# Patient Record
Sex: FEMALE | Race: WHITE | NOT HISPANIC OR LATINO
[De-identification: names, ages, dates, MRNs, and addresses within clinical notes are randomized per-mention and may not be internally consistent; named-entity substitution may affect disease eponyms.]

---

## 2017-05-21 ENCOUNTER — TRANSCRIPTION ENCOUNTER (OUTPATIENT)
Age: 62
End: 2017-05-21

## 2017-08-01 ENCOUNTER — OUTPATIENT (OUTPATIENT)
Dept: OUTPATIENT SERVICES | Facility: HOSPITAL | Age: 62
LOS: 1 days | End: 2017-08-01
Payer: COMMERCIAL

## 2017-08-01 ENCOUNTER — APPOINTMENT (OUTPATIENT)
Dept: MAMMOGRAPHY | Facility: HOSPITAL | Age: 62
End: 2017-08-01
Payer: COMMERCIAL

## 2017-08-01 PROCEDURE — 77063 BREAST TOMOSYNTHESIS BI: CPT | Mod: 26

## 2017-08-01 PROCEDURE — G0202: CPT | Mod: 26

## 2017-08-01 PROCEDURE — 77067 SCR MAMMO BI INCL CAD: CPT

## 2017-08-01 PROCEDURE — 77063 BREAST TOMOSYNTHESIS BI: CPT

## 2017-11-23 ENCOUNTER — EMERGENCY (EMERGENCY)
Facility: HOSPITAL | Age: 62
LOS: 1 days | Discharge: HOME | End: 2017-11-23
Admitting: EMERGENCY MEDICINE
Payer: COMMERCIAL

## 2017-11-23 PROCEDURE — 99284 EMERGENCY DEPT VISIT MOD MDM: CPT | Mod: 25

## 2017-11-24 PROCEDURE — 96374 THER/PROPH/DIAG INJ IV PUSH: CPT

## 2017-11-24 PROCEDURE — 83690 ASSAY OF LIPASE: CPT

## 2017-11-24 PROCEDURE — 99284 EMERGENCY DEPT VISIT MOD MDM: CPT | Mod: 25

## 2017-11-24 PROCEDURE — 85027 COMPLETE CBC AUTOMATED: CPT

## 2017-11-24 PROCEDURE — 80053 COMPREHEN METABOLIC PANEL: CPT

## 2017-11-24 PROCEDURE — 96361 HYDRATE IV INFUSION ADD-ON: CPT

## 2018-08-09 ENCOUNTER — APPOINTMENT (OUTPATIENT)
Dept: MAMMOGRAPHY | Facility: HOSPITAL | Age: 63
End: 2018-08-09
Payer: COMMERCIAL

## 2018-08-09 ENCOUNTER — OUTPATIENT (OUTPATIENT)
Dept: OUTPATIENT SERVICES | Facility: HOSPITAL | Age: 63
LOS: 1 days | End: 2018-08-09
Payer: COMMERCIAL

## 2018-08-09 DIAGNOSIS — Z00.8 ENCOUNTER FOR OTHER GENERAL EXAMINATION: ICD-10-CM

## 2018-08-09 PROCEDURE — 77067 SCR MAMMO BI INCL CAD: CPT | Mod: 26

## 2018-08-09 PROCEDURE — 77063 BREAST TOMOSYNTHESIS BI: CPT

## 2018-08-09 PROCEDURE — 77067 SCR MAMMO BI INCL CAD: CPT

## 2018-08-09 PROCEDURE — 77063 BREAST TOMOSYNTHESIS BI: CPT | Mod: 26

## 2018-08-13 ENCOUNTER — OUTPATIENT (OUTPATIENT)
Dept: OUTPATIENT SERVICES | Facility: HOSPITAL | Age: 63
LOS: 1 days | End: 2018-08-13
Payer: COMMERCIAL

## 2018-08-13 ENCOUNTER — APPOINTMENT (OUTPATIENT)
Dept: RADIOLOGY | Facility: HOSPITAL | Age: 63
End: 2018-08-13
Payer: COMMERCIAL

## 2018-08-13 DIAGNOSIS — M85.80 OTHER SPECIFIED DISORDERS OF BONE DENSITY AND STRUCTURE, UNSPECIFIED SITE: ICD-10-CM

## 2018-08-13 PROCEDURE — 77080 DXA BONE DENSITY AXIAL: CPT | Mod: 26

## 2018-08-13 PROCEDURE — 77080 DXA BONE DENSITY AXIAL: CPT

## 2019-02-08 NOTE — COUNSELING
[Breast Self Exam] : breast self exam [Nutrition] : nutrition [Exercise] : exercise [Vitamins/Supplements] : vitamins/supplements [Vulvar Hygiene] : vulvar hygiene

## 2019-02-11 ENCOUNTER — APPOINTMENT (OUTPATIENT)
Dept: OBGYN | Facility: CLINIC | Age: 64
End: 2019-02-11
Payer: COMMERCIAL

## 2019-02-11 VITALS
SYSTOLIC BLOOD PRESSURE: 116 MMHG | WEIGHT: 151 LBS | DIASTOLIC BLOOD PRESSURE: 76 MMHG | BODY MASS INDEX: 27.79 KG/M2 | HEIGHT: 62 IN | HEART RATE: 74 BPM

## 2019-02-11 DIAGNOSIS — Z01.419 ENCOUNTER FOR GYNECOLOGICAL EXAMINATION (GENERAL) (ROUTINE) W/OUT ABNORMAL FINDINGS: ICD-10-CM

## 2019-02-11 DIAGNOSIS — N95.2 POSTMENOPAUSAL ATROPHIC VAGINITIS: ICD-10-CM

## 2019-02-11 PROCEDURE — 99396 PREV VISIT EST AGE 40-64: CPT

## 2019-02-11 NOTE — PHYSICAL EXAM
[Awake] : awake [Alert] : alert [Soft] : soft [Oriented x3] : oriented to person, place, and time [Vulvar Atrophy] : vulvar atrophy [Normal] : uterus [Atrophy] : atrophy [No Bleeding] : there was no active vaginal bleeding [Uterine Adnexae] : were not tender and not enlarged [Acute Distress] : no acute distress [Mass] : no breast mass [Nipple Discharge] : no nipple discharge [Axillary LAD] : no axillary lymphadenopathy [Tender] : non tender

## 2019-02-21 LAB
CYTOLOGY CVX/VAG DOC THIN PREP: NORMAL
HPV HIGH+LOW RISK DNA PNL CVX: NOT DETECTED

## 2019-03-13 ENCOUNTER — EMERGENCY (EMERGENCY)
Facility: HOSPITAL | Age: 64
LOS: 1 days | Discharge: HOME | End: 2019-03-13
Attending: EMERGENCY MEDICINE | Admitting: EMERGENCY MEDICINE
Payer: COMMERCIAL

## 2019-03-13 VITALS
SYSTOLIC BLOOD PRESSURE: 120 MMHG | TEMPERATURE: 98 F | HEART RATE: 71 BPM | DIASTOLIC BLOOD PRESSURE: 69 MMHG | RESPIRATION RATE: 16 BRPM | WEIGHT: 147.05 LBS | OXYGEN SATURATION: 99 % | HEIGHT: 61 IN

## 2019-03-13 VITALS
DIASTOLIC BLOOD PRESSURE: 75 MMHG | OXYGEN SATURATION: 98 % | RESPIRATION RATE: 16 BRPM | SYSTOLIC BLOOD PRESSURE: 138 MMHG | HEART RATE: 69 BPM | TEMPERATURE: 98 F

## 2019-03-13 PROCEDURE — 99283 EMERGENCY DEPT VISIT LOW MDM: CPT | Mod: 25

## 2019-03-13 PROCEDURE — 99283 EMERGENCY DEPT VISIT LOW MDM: CPT

## 2019-03-13 PROCEDURE — 73562 X-RAY EXAM OF KNEE 3: CPT

## 2019-03-13 PROCEDURE — 73562 X-RAY EXAM OF KNEE 3: CPT | Mod: 26,LT

## 2019-03-13 RX ORDER — IBUPROFEN 200 MG
600 TABLET ORAL ONCE
Qty: 0 | Refills: 0 | Status: COMPLETED | OUTPATIENT
Start: 2019-03-13 | End: 2019-03-13

## 2019-03-13 RX ADMIN — Medication 600 MILLIGRAM(S): at 18:50

## 2019-03-13 RX ADMIN — Medication 600 MILLIGRAM(S): at 18:26

## 2019-03-13 NOTE — ED PROVIDER NOTE - MUSCULOSKELETAL, MLM
L knee: +ttp inferior/lateral knee with FROM, +mild inflammation noted, no erythema noted, skin inatct, FROM knee, toes warm & mobile, cap refill<2sec, pulses and sensation intact, NVI; Spine appears normal, range of motion is not limited

## 2019-03-13 NOTE — ED PROVIDER NOTE - CLINICAL SUMMARY MEDICAL DECISION MAKING FREE TEXT BOX
62 y/o F with L knee pain s/p twisting injury today in the airplane, will give motrin, ice, xray, knee immobilizer, crutches, f/u ortho

## 2019-03-13 NOTE — ED PROVIDER NOTE - ATTENDING CONTRIBUTION TO CARE
63 y.o. c/o left knee pain, was getting into airplane seat, tripped over bag on floor, left knee underwent varus stress, very difficult to bear weight, pain is mostly with weight bearing, no h/o injury to knee previously, no paresthesias, no other injury, does have an orthopedist group for f/u; on exam pt is wd, wn, nad; left LE - no deformity, mild ttp lateral knee joint, + pain with varus stress, negative drawer signs, able to straight leg raise, FROM hip/ankle/foot, mild knee effusion, skin intact, distal sensation intact, 2+ DP pulses; A/P xr negative, PA to apply knee immobilizer and provide crutches, to f/u ortho as outpt

## 2019-03-13 NOTE — ED ADULT NURSE REASSESSMENT NOTE - NS ED NURSE REASSESS COMMENT FT1
pt instructed on use of crutches. knee immobilizer applied. pt d/c via w/c. NAD
received report and assumed care of pt at change of shift. pt awake and alert. seen and examined by dr george. x-ray completed. NAD presently

## 2019-03-13 NOTE — ED PROVIDER NOTE - PROGRESS NOTE DETAILS
Pt examined by ED attending, Dr. Sharp, will assess pt and discuss plan. Reevaluated patient at bedside.  Patient feeling much improved.  Discussed the results of all diagnostic testing in ED and copies of all reports given. Will place in knee immobilizer, crutches, f/u ortho.  An opportunity to ask questions was given.  Discussed the importance of prompt, close medical follow-up.  Patient will return with any changes, concerns or persistent / worsening symptoms.  Understanding of all instructions verbalized. PAUL Zaman: this pt was not seen by myself

## 2019-03-13 NOTE — ED ADULT NURSE NOTE - OBJECTIVE STATEMENT
Amb to ED with crutches.. Pt just got [place] the plane from [place]. Getting on plane she tripped on a carryon bag twisting her left knee. States she can't bear wt on it. Discomfort with movement. No swelling or discoloration noted.

## 2019-03-13 NOTE — ED ADULT NURSE NOTE - NSIMPLEMENTINTERV_GEN_ALL_ED
Implemented All Universal Safety Interventions:  [place] to call system. Call bell, personal items and telephone within reach. Instruct patient to call for assistance. Room bathroom lighting operational. Non-slip footwear when patient is off stretcher. Physically safe environment: no spills, clutter or unnecessary equipment. Stretcher in lowest position, wheels locked, appropriate side rails in place.

## 2019-03-13 NOTE — ED PROVIDER NOTE - OBJECTIVE STATEMENT
64 y/o F presents with c/o L knee pain today. Pt states that her L foot got caught on a bag in the airplane today, twisted her knee and "heard a crack and pop" in her L knee. States that she sat in the plane for 3 hours and got back to [place], pain was not bad at first when she got off the plane but states that it has been progressively getting worse with difficulty weight bearing on left leg due to pain. Denies direct trauma of knee, open wounds, fall, numbness, tingling, calf pain/swelling, other injuries/symptoms.

## 2019-03-15 PROBLEM — E03.9 HYPOTHYROIDISM, UNSPECIFIED: Chronic | Status: ACTIVE | Noted: 2019-03-13

## 2019-03-18 ENCOUNTER — APPOINTMENT (OUTPATIENT)
Dept: ORTHOPEDIC SURGERY | Facility: CLINIC | Age: 64
End: 2019-03-18

## 2019-03-19 ENCOUNTER — APPOINTMENT (OUTPATIENT)
Dept: ORTHOPEDIC SURGERY | Facility: CLINIC | Age: 64
End: 2019-03-19
Payer: COMMERCIAL

## 2019-03-19 DIAGNOSIS — S89.92XA UNSPECIFIED INJURY OF LEFT LOWER LEG, INITIAL ENCOUNTER: ICD-10-CM

## 2019-03-19 PROCEDURE — 99203 OFFICE O/P NEW LOW 30 MIN: CPT

## 2019-03-19 NOTE — PHYSICAL EXAM
[de-identified] : Oriented to time, place, person\par Mood: Normal\par Affect: Normal\par Appearance: Healthy, well appearing, no acute distress.\par Gait: Antalgic\par Assistive Devices: Immobilizer/crutches\par \par Left knee exam\par \par Skin: Clean, dry, intact\par Inspection: No obvious malalignment, no masses, moderate swelling, mild effusion\par Pulses: 2+ DP/PT pulses\par ROM: 0-95 degrees of flexion. Moderate pain with deep knee flexion/extension.\par Tenderness: Mild MJLT. Positive LJLT. No pain over the patella facets. No pain to the quadriceps tendon. No pain to the patella tendon. No posterior knee tenderness.\par Stability: Stable to varus, valgus. IIB lachman testing. Positive anterior drawer, negative posterior drawer.\par Strength: 5/5 Q/H/TA/GS/EHL, without atrophy\par Neuro: In tact to light touch throughout, DTR's normal\par Additional tests: Positive McMurrays test, Negative patellar grind test.  [de-identified] : Images were reviewed from the ER dated 3.13.19. \par \par Multiple images left knee showed no evidence of bony injury, or mikie dislocation. There is no underlying degenerative arthritic change seen. Overall alignment is maintained. Otherwise unremarkable.

## 2019-03-19 NOTE — HISTORY OF PRESENT ILLNESS
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 She tripped over a bag on plane and fell directly into her knee She felt a pop and when she got off the plain her knee gave out on her walking. She was evaluated at ER and x-rays were negative for fx. She was placed in straight leg brace and ambulating via crutches. She reports that even with brace feels like her knee is going to buckle. Pain is described to be dull,  intermittent, brought on with when weightbearing. She has continued to work until yesterday. Denies  catching, locking, numbness or tingling. She is not taking pain medication. \par \par The patient's past medical history, past surgical history, medications and allergies were reviewed by me today with the patient and documented accordingly. In addition, the patient's family and social history, which were noncontributory to this visit, were reviewed also.\par

## 2019-03-19 NOTE — DISCUSSION/SUMMARY
[de-identified] : 63-year-old female with left knee injury\par \par Patient presents with an acute injury to her left knee. Patient has a resultant instability consistent with possible ligamentous injury. Consent for anterior cruciate ligament disruption as well as some associated bone contusions to the lateral joint. Considering patient's activity level, function, recommendation would be for MRI imaging to determine degree of internal derangement left knee.\par \par Recommendation: WBAT, ice, NSAIDs, discontinue brace, range of motion to tolerance. Crutches as needed.\par \par Followup after MRI

## 2019-03-21 ENCOUNTER — OUTPATIENT (OUTPATIENT)
Dept: OUTPATIENT SERVICES | Facility: HOSPITAL | Age: 64
LOS: 1 days | End: 2019-03-21
Payer: COMMERCIAL

## 2019-03-21 ENCOUNTER — APPOINTMENT (OUTPATIENT)
Dept: MRI IMAGING | Facility: CLINIC | Age: 64
End: 2019-03-21
Payer: COMMERCIAL

## 2019-03-21 DIAGNOSIS — Z00.8 ENCOUNTER FOR OTHER GENERAL EXAMINATION: ICD-10-CM

## 2019-03-21 PROCEDURE — 73721 MRI JNT OF LWR EXTRE W/O DYE: CPT

## 2019-03-21 PROCEDURE — 73721 MRI JNT OF LWR EXTRE W/O DYE: CPT | Mod: 26,LT

## 2019-03-25 ENCOUNTER — APPOINTMENT (OUTPATIENT)
Dept: ORTHOPEDIC SURGERY | Facility: CLINIC | Age: 64
End: 2019-03-25
Payer: COMMERCIAL

## 2019-03-25 PROCEDURE — 99214 OFFICE O/P EST MOD 30 MIN: CPT

## 2019-03-29 NOTE — DISCUSSION/SUMMARY
[de-identified] : 63-year-old female with left knee ACL tear/medial meniscus tear\par \par Patient's acute injury to the left knee consistent with anterior cruciate ligament disruption as well as discomfort in the medial compartment consistent with posterior horn tear. A discussion was had with the patient regarding these findings, as well as treatment alternatives. Considering her high activity level the patient, patient is considering anterior cruciate ligament reconstruction. I discussed potential options for conservative/nonoperative management with activity restriction with possible brace use as an alternative.\par \par All risks, benefits and alternatives to left anterior cruciate ligament reconstruction with evaluation of the meniscal and chondral surfaces were discussed in great detail with the patient. Risks include but are not limited to pain, bleeding, infection, stiffness/instability, impingement, graft re-rupture, meniscectomy versus repair, medical complications and risks of anesthesia. In addition, a discussion was had with the patient regarding anterior cruciate ligament graft options. This included the role of autograft versus allograft, and the associated donor site morbidity and rerupture rate with autograft versus risk of disease transmission/rerupture with allograft use. The patient expressed understanding and all questions were answered. \par \par The patient is electing to proceed with a left ACL allograft reconstruction with evaluation of the medial meniscus, and will have the patient scheduled accordingly.

## 2019-03-29 NOTE — PHYSICAL EXAM
[de-identified] : Oriented to time, place, person\par Mood: Normal\par Affect: Normal\par Appearance: Healthy, well appearing, no acute distress.\par Gait: Antalgic\par Assistive Devices: Immobilizer/crutches\par \par Left knee exam\par \par Skin: Clean, dry, intact\par Inspection: No obvious malalignment, no masses, moderate swelling, mild effusion\par Pulses: 2+ DP/PT pulses\par ROM: 0-155 degrees of flexion. Moderate pain with deep knee flexion/extension.\par Tenderness: Mild MJLT. Positive LJLT. No pain over the patella facets. No pain to the quadriceps tendon. No pain to the patella tendon. No posterior knee tenderness.\par Stability: Stable to varus, valgus. IIB lachman testing. Positive anterior drawer, negative posterior drawer.\par Strength: 5/5 Q/H/TA/GS/EHL, without atrophy\par Neuro: In tact to light touch throughout, DTR's normal\par Additional tests: Positive McMurrays test, Negative patellar grind test.  [de-identified] : Images were reviewed from the ER dated 3.13.19. \par \par Multiple images left knee showed no evidence of bony injury, or mikie dislocation. There is no underlying degenerative arthritic change seen. Overall alignment is maintained. Otherwise unremarkable.\par \par MRI left knee dated 3.21.19 shows evidence of full thickness anterior cruciate ligament injury with associated bone contusions. Tear of the medial meniscus. Chondral surfaces remain intact.

## 2019-03-29 NOTE — HISTORY OF PRESENT ILLNESS
[de-identified] : 63 year old female works as a /[employer]  presents today for follow up of left knee pain. She obtained MRI and is here for review of result. She tripped over a bag on plane and fell directly into her knee She felt a pop and [place] her knee gave out on her walking. She was evaluated at ER and x-rays were negative for fx. She is ambulating via crutches. Pain has improved since last visit. Denies catching, locking, numbness or tingling. She is not taking pain medication.

## 2019-04-08 ENCOUNTER — OUTPATIENT (OUTPATIENT)
Dept: OUTPATIENT SERVICES | Facility: HOSPITAL | Age: 64
LOS: 1 days | End: 2019-04-08
Payer: COMMERCIAL

## 2019-04-08 VITALS
HEART RATE: 64 BPM | WEIGHT: 149.03 LBS | SYSTOLIC BLOOD PRESSURE: 130 MMHG | RESPIRATION RATE: 16 BRPM | HEIGHT: 61 IN | DIASTOLIC BLOOD PRESSURE: 70 MMHG | TEMPERATURE: 98 F | OXYGEN SATURATION: 98 %

## 2019-04-08 DIAGNOSIS — S83.512A SPRAIN OF ANTERIOR CRUCIATE LIGAMENT OF LEFT KNEE, INITIAL ENCOUNTER: ICD-10-CM

## 2019-04-08 DIAGNOSIS — T14.8XXA OTHER INJURY OF UNSPECIFIED BODY REGION, INITIAL ENCOUNTER: ICD-10-CM

## 2019-04-08 DIAGNOSIS — Z98.890 OTHER SPECIFIED POSTPROCEDURAL STATES: Chronic | ICD-10-CM

## 2019-04-08 LAB
ANION GAP SERPL CALC-SCNC: 11 MMO/L — SIGNIFICANT CHANGE UP (ref 7–14)
BASOPHILS # BLD AUTO: 0.04 K/UL — SIGNIFICANT CHANGE UP (ref 0–0.2)
BASOPHILS NFR BLD AUTO: 1 % — SIGNIFICANT CHANGE UP (ref 0–2)
BUN SERPL-MCNC: 17 MG/DL — SIGNIFICANT CHANGE UP (ref 7–23)
CALCIUM SERPL-MCNC: 9.7 MG/DL — SIGNIFICANT CHANGE UP (ref 8.4–10.5)
CHLORIDE SERPL-SCNC: 100 MMOL/L — SIGNIFICANT CHANGE UP (ref 98–107)
CO2 SERPL-SCNC: 28 MMOL/L — SIGNIFICANT CHANGE UP (ref 22–31)
CREAT SERPL-MCNC: 0.74 MG/DL — SIGNIFICANT CHANGE UP (ref 0.5–1.3)
EOSINOPHIL # BLD AUTO: 0.21 K/UL — SIGNIFICANT CHANGE UP (ref 0–0.5)
EOSINOPHIL NFR BLD AUTO: 5.1 % — SIGNIFICANT CHANGE UP (ref 0–6)
GLUCOSE SERPL-MCNC: 80 MG/DL — SIGNIFICANT CHANGE UP (ref 70–99)
HBA1C BLD-MCNC: 5.3 % — SIGNIFICANT CHANGE UP (ref 4–5.6)
HCT VFR BLD CALC: 38.4 % — SIGNIFICANT CHANGE UP (ref 34.5–45)
HCT VFR BLD CALC: 38.4 % — SIGNIFICANT CHANGE UP (ref 34.5–45)
HGB BLD-MCNC: 12.2 G/DL — SIGNIFICANT CHANGE UP (ref 11.5–15.5)
HGB BLD-MCNC: 12.2 G/DL — SIGNIFICANT CHANGE UP (ref 11.5–15.5)
IMM GRANULOCYTES NFR BLD AUTO: 0.2 % — SIGNIFICANT CHANGE UP (ref 0–1.5)
LYMPHOCYTES # BLD AUTO: 1.35 K/UL — SIGNIFICANT CHANGE UP (ref 1–3.3)
LYMPHOCYTES # BLD AUTO: 32.8 % — SIGNIFICANT CHANGE UP (ref 13–44)
MCHC RBC-ENTMCNC: 30.2 PG — SIGNIFICANT CHANGE UP (ref 27–34)
MCHC RBC-ENTMCNC: 30.2 PG — SIGNIFICANT CHANGE UP (ref 27–34)
MCHC RBC-ENTMCNC: 31.8 % — LOW (ref 32–36)
MCHC RBC-ENTMCNC: 31.8 % — LOW (ref 32–36)
MCV RBC AUTO: 95 FL — SIGNIFICANT CHANGE UP (ref 80–100)
MCV RBC AUTO: 95 FL — SIGNIFICANT CHANGE UP (ref 80–100)
MONOCYTES # BLD AUTO: 0.4 K/UL — SIGNIFICANT CHANGE UP (ref 0–0.9)
MONOCYTES NFR BLD AUTO: 9.7 % — SIGNIFICANT CHANGE UP (ref 2–14)
NEUTROPHILS # BLD AUTO: 2.11 K/UL — SIGNIFICANT CHANGE UP (ref 1.8–7.4)
NEUTROPHILS NFR BLD AUTO: 51.2 % — SIGNIFICANT CHANGE UP (ref 43–77)
NRBC # FLD: 0 K/UL — SIGNIFICANT CHANGE UP (ref 0–0)
NRBC # FLD: 0 K/UL — SIGNIFICANT CHANGE UP (ref 0–0)
PLATELET # BLD AUTO: 238 K/UL — SIGNIFICANT CHANGE UP (ref 150–400)
PLATELET # BLD AUTO: 238 K/UL — SIGNIFICANT CHANGE UP (ref 150–400)
PMV BLD: 9.1 FL — SIGNIFICANT CHANGE UP (ref 7–13)
PMV BLD: 9.1 FL — SIGNIFICANT CHANGE UP (ref 7–13)
POTASSIUM SERPL-MCNC: 3.7 MMOL/L — SIGNIFICANT CHANGE UP (ref 3.5–5.3)
POTASSIUM SERPL-SCNC: 3.7 MMOL/L — SIGNIFICANT CHANGE UP (ref 3.5–5.3)
RBC # BLD: 4.04 M/UL — SIGNIFICANT CHANGE UP (ref 3.8–5.2)
RBC # BLD: 4.04 M/UL — SIGNIFICANT CHANGE UP (ref 3.8–5.2)
RBC # FLD: 12.6 % — SIGNIFICANT CHANGE UP (ref 10.3–14.5)
RBC # FLD: 12.6 % — SIGNIFICANT CHANGE UP (ref 10.3–14.5)
SODIUM SERPL-SCNC: 139 MMOL/L — SIGNIFICANT CHANGE UP (ref 135–145)
WBC # BLD: 4.12 K/UL — SIGNIFICANT CHANGE UP (ref 3.8–10.5)
WBC # BLD: 4.12 K/UL — SIGNIFICANT CHANGE UP (ref 3.8–10.5)
WBC # FLD AUTO: 4.12 K/UL — SIGNIFICANT CHANGE UP (ref 3.8–10.5)
WBC # FLD AUTO: 4.12 K/UL — SIGNIFICANT CHANGE UP (ref 3.8–10.5)

## 2019-04-08 PROCEDURE — 93010 ELECTROCARDIOGRAM REPORT: CPT

## 2019-04-08 RX ORDER — LEVOTHYROXINE SODIUM 125 MCG
0 TABLET ORAL
Qty: 0 | Refills: 0 | COMMUNITY

## 2019-04-08 NOTE — H&P PST ADULT - NSICDXPASTSURGICALHX_GEN_ALL_CORE_FT
PAST SURGICAL HISTORY:  Delivery by elective  section x 2 ( and )    H/O carpal tunnel repair left hand in 2010 and right hand in 2009

## 2019-04-08 NOTE — H&P PST ADULT - NSANTHOSAYNRD_GEN_A_CORE
No. EVELYNE screening performed.  STOP BANG Legend: 0-2 = LOW Risk; 3-4 = INTERMEDIATE Risk; 5-8 = HIGH Risk

## 2019-04-08 NOTE — H&P PST ADULT - HISTORY OF PRESENT ILLNESS
This is a 62 y/o female who presents with who presents with recent injury to her left knee when she fell on an airplane. Visited ER with subsequent xray. When she consulted with orthopedic surgeon, MRI confirmed pathology. Scheduled for left knee ACL reconstruction with bone to bone allograft, partial medial meniscectomy on 4-19-19

## 2019-04-08 NOTE — H&P PST ADULT - NSICDXPASTMEDICALHX_GEN_ALL_CORE_FT
PAST MEDICAL HISTORY:  Hypothyroid     Ligament tear left ACL in 2019    Tear of meniscus of knee partial medial meniscus PAST MEDICAL HISTORY:  Hypothyroid     Ligament tear left ACL in 2019    Prediabetes diagnosed in 2018    Tear of meniscus of knee partial medial meniscus

## 2019-04-09 PROBLEM — S83.209A UNSPECIFIED TEAR OF UNSPECIFIED MENISCUS, CURRENT INJURY, UNSPECIFIED KNEE, INITIAL ENCOUNTER: Chronic | Status: ACTIVE | Noted: 2019-04-08

## 2019-04-09 PROBLEM — T14.8XXA OTHER INJURY OF UNSPECIFIED BODY REGION, INITIAL ENCOUNTER: Chronic | Status: ACTIVE | Noted: 2019-04-08

## 2019-04-09 PROBLEM — R73.03 PREDIABETES: Chronic | Status: ACTIVE | Noted: 2019-04-08

## 2019-04-16 ENCOUNTER — APPOINTMENT (OUTPATIENT)
Dept: ORTHOPEDIC SURGERY | Facility: CLINIC | Age: 64
End: 2019-04-16
Payer: COMMERCIAL

## 2019-04-16 VITALS
HEART RATE: 90 BPM | WEIGHT: 151 LBS | DIASTOLIC BLOOD PRESSURE: 74 MMHG | SYSTOLIC BLOOD PRESSURE: 147 MMHG | BODY MASS INDEX: 27.79 KG/M2 | HEIGHT: 62 IN

## 2019-04-16 PROCEDURE — 99213 OFFICE O/P EST LOW 20 MIN: CPT

## 2019-04-18 ENCOUNTER — TRANSCRIPTION ENCOUNTER (OUTPATIENT)
Age: 64
End: 2019-04-18

## 2019-04-18 RX ORDER — ASPIRIN 325 MG/1
325 TABLET, FILM COATED ORAL DAILY
Qty: 28 | Refills: 0 | Status: ACTIVE | COMMUNITY
Start: 2019-04-18 | End: 1900-01-01

## 2019-04-18 RX ORDER — OXYCODONE AND ACETAMINOPHEN 5; 325 MG/1; MG/1
5-325 TABLET ORAL
Qty: 50 | Refills: 0 | Status: ACTIVE | COMMUNITY
Start: 2019-04-18 | End: 1900-01-01

## 2019-04-18 RX ORDER — ONDANSETRON 4 MG/1
4 TABLET ORAL
Qty: 20 | Refills: 0 | Status: ACTIVE | COMMUNITY
Start: 2019-04-18 | End: 1900-01-01

## 2019-04-19 ENCOUNTER — APPOINTMENT (OUTPATIENT)
Dept: ORTHOPEDIC SURGERY | Facility: AMBULATORY SURGERY CENTER | Age: 64
End: 2019-04-19

## 2019-04-19 ENCOUNTER — OUTPATIENT (OUTPATIENT)
Dept: OUTPATIENT SERVICES | Facility: HOSPITAL | Age: 64
LOS: 1 days | Discharge: HOME | End: 2019-04-19
Payer: COMMERCIAL

## 2019-04-19 VITALS
OXYGEN SATURATION: 99 % | RESPIRATION RATE: 17 BRPM | SYSTOLIC BLOOD PRESSURE: 135 MMHG | DIASTOLIC BLOOD PRESSURE: 62 MMHG | WEIGHT: 149.03 LBS | HEART RATE: 68 BPM | HEIGHT: 61 IN | TEMPERATURE: 98 F

## 2019-04-19 VITALS
DIASTOLIC BLOOD PRESSURE: 87 MMHG | OXYGEN SATURATION: 100 % | TEMPERATURE: 98 F | RESPIRATION RATE: 13 BRPM | HEART RATE: 60 BPM | SYSTOLIC BLOOD PRESSURE: 109 MMHG

## 2019-04-19 DIAGNOSIS — S83.512A SPRAIN OF ANTERIOR CRUCIATE LIGAMENT OF LEFT KNEE, INITIAL ENCOUNTER: ICD-10-CM

## 2019-04-19 DIAGNOSIS — Z98.890 OTHER SPECIFIED POSTPROCEDURAL STATES: Chronic | ICD-10-CM

## 2019-04-19 PROCEDURE — 29888 ARTHRS AID ACL RPR/AGMNTJ: CPT | Mod: LT

## 2019-04-19 RX ORDER — LEVOTHYROXINE SODIUM 112 UG/1
112 TABLET ORAL
Qty: 90 | Refills: 0 | Status: ACTIVE | COMMUNITY
Start: 2018-07-11

## 2019-04-19 RX ORDER — CLOTRIMAZOLE AND BETAMETHASONE DIPROPIONATE 10; .5 MG/G; MG/G
1-0.05 CREAM TOPICAL
Qty: 45 | Refills: 0 | Status: ACTIVE | COMMUNITY
Start: 2019-02-14

## 2019-04-19 RX ORDER — EFINACONAZOLE 100 MG/ML
10 SOLUTION TOPICAL
Qty: 8 | Refills: 0 | Status: ACTIVE | COMMUNITY
Start: 2019-02-14

## 2019-04-19 NOTE — ASU DISCHARGE PLAN (ADULT/PEDIATRIC) - CARE PROVIDER_API CALL
Carson Machado)  Orthopedics  [address]  [place] [address]  Phone: [phone]  Fax: [phone]  Follow Up Time:

## 2019-04-26 NOTE — DISCUSSION/SUMMARY
[de-identified] : 63-year-old female with left knee ACL tear/medial meniscus tear\par \par A discussion was had with the patient regarding surgical management of her left knee injury. We will proceed with a left knee ACL allograft reconstruction with evaluation of the undersurface of the medial meniscus. Discussed potential need for partial meniscectomy versus attempted repair versus no intervention depending on the degree of internal derangement seen at time of arthroscopy. All patient's questions were answered.\par \par Patient is proceeding with surgical intervention later this week.\par \par Followup at that time.

## 2019-04-26 NOTE — PHYSICAL EXAM
[de-identified] : Images were reviewed from the ER dated 3.13.19. \par \par Multiple images left knee showed no evidence of bony injury, or mikie dislocation. There is no underlying degenerative arthritic change seen. Overall alignment is maintained. Otherwise unremarkable.\par \par MRI left knee dated 3.21.19 shows evidence of full thickness anterior cruciate ligament injury with associated bone contusions. Tear of the medial meniscus. Chondral surfaces remain intact. [de-identified] : Oriented to time, place, person\par Mood: Normal\par Affect: Normal\par Appearance: Healthy, well appearing, no acute distress.\par Gait: Antalgic\par Assistive Devices: Immobilizer/crutches\par \par Left knee exam\par \par Skin: Clean, dry, intact\par Inspection: No obvious malalignment, no masses, mild swelling, mild effusion\par Pulses: 2+ DP/PT pulses\par ROM: 5-115 degrees of flexion. Moderate pain with deep knee flexion/extension.\par Tenderness: Mild MJLT. Positive LJLT. No pain over the patella facets. No pain to the quadriceps tendon. No pain to the patella tendon. No posterior knee tenderness.\par Stability: Stable to varus, valgus. IIB lachman testing. Positive anterior drawer, negative posterior drawer.\par Strength: 5/5 Q/H/TA/GS/EHL, without atrophy\par Neuro: In tact to light touch throughout, DTR's normal\par Additional tests: Positive McMurrays test, Negative patellar grind test.

## 2019-04-26 NOTE — HISTORY OF PRESENT ILLNESS
[de-identified] : 63 year old female presents today for follow up of left knee pain. She is here for presurgical consultation for ACL tear, MMT. She tripped over a bag on plane and fell directly into her knee. She felt a pop and [place] her knee gave out on her walking. She is still ambulating via crutches. Pain has improved since last visit. Denies catching, locking, numbness or tingling. She is not taking pain medication.

## 2019-04-30 ENCOUNTER — APPOINTMENT (OUTPATIENT)
Dept: ORTHOPEDIC SURGERY | Facility: CLINIC | Age: 64
End: 2019-04-30
Payer: COMMERCIAL

## 2019-04-30 PROCEDURE — 99024 POSTOP FOLLOW-UP VISIT: CPT

## 2019-05-01 NOTE — HISTORY OF PRESENT ILLNESS
[4] : the patient reports pain that is 4/10 in severity [Fever] : no fever [Nausea] : no nausea [Chills] : no chills [Clean/Dry/Intact] : clean, dry and intact [Vomiting] : no vomiting [Healed] : healed [Erythema] : not erythematous [Swelling] : swollen [Discharge] : absent of discharge [Dehiscence] : not dehisced [Neuro Intact] : an unremarkable neurological exam [Vascular Intact] : ~T peripheral vascular exam normal [Doing Well] : is doing well [Excellent Pain Control] : has excellent pain control [No Sign of Infection] : is showing no signs of infection [Sutures Removed] : sutures were removed [de-identified] : 63-year-old female status post left knee ACL reconstruction (BTB allo) 4.19.19 [Steri-Strips Removed & Replaced] : steri-strips removed and replaced [de-identified] : Left knee exam\par \par Skin: Incision(s) clean, dry, intact, no drainage, healed\par Inspection: Residual swelling, moderate residual effusion, ecchymosis\par Pulses: 2+ DP/PT pulses\par ROM: Not tested. \par Tenderness: Tender throughout anterior knee joint.\par Stability: Stable\par Strength: Intact Q/H/TA/GS/EHL\par Neuro: In tact to light touch throughout [de-identified] : 63-year-old female status post left knee ACL reconstruction (BTB allo). She is doing well. She presents in [place] brace. Denies post op complication. Starting PT today. [de-identified] : 63-year-old female status post left knee ACL reconstruction (BTB allo) \par \par All intraoperative imaging was discussed in detail with the patient. All questions were answered. Patient doing extremely well at this time. Discussed details of medial meniscus pathology, and reasons to not debride or repair.\par \par Recommendations:\par \par 1. PT evaluation and treatment; including AAROM/AROM, therapeutic exercise (include hamstring and quadriceps strengthening), heel slides, nonweightbearing stretch of the gastrocsoleus complex and straight leg raises to prevent extension lag. Progression to closed chain exercises/balance exercise/bike in 2-4 weeks.\par 2. Brace: Unlock brace for ambulation as tolerated. Remove the brace for sleeping as tolerated. May discontinue brace once full extension and without extensor lag has been achieved (approx 4-6wks post-op)\par 3. Meds: Continue [id number] daily, pain medication prn, may transition to NSAIDS.\par 4. Ice/elevate as neededand\par 5. Restrictions: None\par \par Followup in 4 weeks for imaging and clinical evaluation of stability.

## 2019-05-28 ENCOUNTER — APPOINTMENT (OUTPATIENT)
Dept: ORTHOPEDIC SURGERY | Facility: CLINIC | Age: 64
End: 2019-05-28
Payer: COMMERCIAL

## 2019-05-28 PROCEDURE — 99024 POSTOP FOLLOW-UP VISIT: CPT

## 2019-05-28 PROCEDURE — 73562 X-RAY EXAM OF KNEE 3: CPT | Mod: LT

## 2019-05-29 NOTE — HISTORY OF PRESENT ILLNESS
[Clean/Dry/Intact] : clean, dry and intact [Healed] : healed [Swelling] : swollen [Neuro Intact] : an unremarkable neurological exam [Vascular Intact] : ~T peripheral vascular exam normal [Doing Well] : is doing well [No Sign of Infection] : is showing no signs of infection [Excellent Pain Control] : has excellent pain control [0] : no pain reported [Fever] : no fever [Chills] : no chills [Nausea] : no nausea [Discharge] : absent of discharge [Vomiting] : no vomiting [Erythema] : not erythematous [Dehiscence] : not dehisced [de-identified] : 63-year-old female status post left knee ACL reconstruction (BTB allo) 4.19.19 [de-identified] : Left knee exam\par \par Skin: Incision(s) clean, dry, intact, no drainage, healed\par Inspection: Mild swelling, min residual effusion \par Pulses: 2+ DP/PT pulses\par ROM: 5-120\par Tenderness: Mild throughout\par Stability: Stable, IA lachman\par Strength: 4/5 Q/H 5/5 TA/GS/EHL\par Neuro: In tact to light touch throughout [de-identified] : 63-year-old female status post left knee ACL reconstruction (BTB allo). She is doing well. She presents in [place] brace. Denies post op complication. Attending PT 3 x per week.  [de-identified] : 63-year-old female status post left knee ACL reconstruction (BTB allo) \par \par Postoperative imaging shows anatomic ACL reconstruction. Patient has good clinical stability on examination, and is doing well with postoperative rehabilitation at this time. No concerns at this time.\par \par Recommendations:\par \par 1. Continue PT per protocol; WBAT, closed chain exercises, proprioception exercise, begin elliptical when tolerated, hamstring stretching/strengthening\par 2. Brace: May discontinue brace once full extension and without extensor lag has been achieved\par 3. Meds: Discontinue [id number] daily, OTC pain/Nsaids medication prn\par 4. Continue symptommatic Ice/elevate as needed\par 5. Restrictions: None\par \par Followup in 6 weeks.  [de-identified] : \par The following radiographs were ordered and read by me during this patients visit. I reviewed each radiograph in detail with the patient and discussed the findings as highlighted below. \par \par 3 views of the left knee were obtained today that show interval change of an anatomic anterior cruciate ligament reconstruction with PTB allograft. No evidence of hardware failure, otherwise unremarkable.

## 2019-07-10 ENCOUNTER — APPOINTMENT (OUTPATIENT)
Dept: ORTHOPEDIC SURGERY | Facility: CLINIC | Age: 64
End: 2019-07-10
Payer: COMMERCIAL

## 2019-07-10 PROCEDURE — 99024 POSTOP FOLLOW-UP VISIT: CPT

## 2019-07-12 NOTE — HISTORY OF PRESENT ILLNESS
[0] : no pain reported [Clean/Dry/Intact] : clean, dry and intact [Healed] : healed [Swelling] : swollen [Neuro Intact] : an unremarkable neurological exam [Doing Well] : is doing well [Vascular Intact] : ~T peripheral vascular exam normal [No Sign of Infection] : is showing no signs of infection [Excellent Pain Control] : has excellent pain control [Fever] : no fever [Chills] : no chills [Vomiting] : no vomiting [Erythema] : not erythematous [Nausea] : no nausea [Discharge] : absent of discharge [Dehiscence] : not dehisced [de-identified] : 63-year-old female status post left knee ACL reconstruction (BTB allo) 4.19.19 [de-identified] : 63-year-old female status post left knee ACL reconstruction (BTB allo) \par \par Continues progressions with range of motion and function left knee. Transitioned into a functional brace at this time. Discussion was had with patient regarding continued physical therapy, and gradual return to light activity within the protocol.\par \par Recommendations:\par \par 1. Continue PT per protocol; Progress to terminal ROM as tolerated. Continue hamstring/quad strengthening, advance closed chain exercises, proprioception exercise. Begin running/impact loading program. Progression of sport specific endurance/strengthening/sport specific drills. Goal of return to sport at 9-12mos following confirmation of strength, confidence and agility with completion of a RTP program.\par 2. Brace: OTC knee sleeve as needed\par 3. Meds: PRN use NSAIDs/Tylenol\par 4. Continue symptommatic Ice/elevate as needed\par 5. Restrictions: None\par \par Followup in 3 mos. \par  [de-identified] : Left knee exam\par \par Skin: Incision(s) clean, dry, intact, no drainage, healed\par Inspection: Mild swelling, min residual effusion \par Pulses: 2+ DP/PT pulses\par ROM: 5-130\par Tenderness: Mild throughout\par Stability: Stable, IA lachman\par Strength: 4/5 Q/H 5/5 TA/GS/EHL quad atrophy\par Neuro: In tact to light touch throughout [de-identified] : 63-year-old female status post left knee ACL reconstruction (BTB allo). She is doing well. She transitioned into a functional brace. Denies post op complication. Attending PT 2 x per week.

## 2019-08-26 ENCOUNTER — APPOINTMENT (OUTPATIENT)
Dept: ORTHOPEDIC SURGERY | Facility: CLINIC | Age: 64
End: 2019-08-26
Payer: COMMERCIAL

## 2019-08-26 VITALS
WEIGHT: 151 LBS | HEART RATE: 67 BPM | DIASTOLIC BLOOD PRESSURE: 84 MMHG | BODY MASS INDEX: 27.79 KG/M2 | SYSTOLIC BLOOD PRESSURE: 134 MMHG | HEIGHT: 62 IN

## 2019-08-26 PROCEDURE — 99213 OFFICE O/P EST LOW 20 MIN: CPT

## 2019-08-26 PROCEDURE — 73562 X-RAY EXAM OF KNEE 3: CPT | Mod: LT

## 2019-09-03 NOTE — DISCUSSION/SUMMARY
[de-identified] : 63-year-old female status post left knee ACL reconstruction (BTB allo) \par \par Patient is doing well 4 months status post ACL reconstruction. Still has some limitations of ADLs, but symptoms are improving. Patient has persistent with physical therapy and home exercise program. \par \par Recommendation:\par 1. Continue PT protocol \par 2. Brace: Functional bracing \par 3. Meds: PRN use NSAIDs/Tylenol\par 4. Continue symptommatic Ice/elevate as needed\par 5. Restrictions: None\par \par Followup in 3 mos.

## 2019-09-03 NOTE — HISTORY OF PRESENT ILLNESS
[0] : no pain reported [Clean/Dry/Intact] : clean, dry and intact [Healed] : healed [Swelling] : swollen [Neuro Intact] : an unremarkable neurological exam [Vascular Intact] : ~T peripheral vascular exam normal [Doing Well] : is doing well [Excellent Pain Control] : has excellent pain control [No Sign of Infection] : is showing no signs of infection [Chills] : no chills [Fever] : no fever [Nausea] : no nausea [Erythema] : not erythematous [Vomiting] : no vomiting [Discharge] : absent of discharge [Dehiscence] : not dehisced [de-identified] : 63-year-old female status post left knee ACL reconstruction (BTB allo) 4.19.19 [de-identified] : Left knee exam\par \par Skin: Incision(s) clean, dry, intact, no drainage, healed\par Inspection: Mild swelling, min residual effusion \par Pulses: 2+ DP/PT pulses\par ROM: 5-130\par Tenderness: Mild throughout\par Stability: Stable, IA lachman\par Strength: 4/5 Q/H 5/5 TA/GS/EHL quad atrophy\par Neuro: In tact to light touch throughout [de-identified] : 63-year-old female status post left knee ACL reconstruction (BTB allo) \par \par Continues progressions with range of motion and function left knee. Transitioned into a functional brace at this time. Discussion was had with patient regarding continued physical therapy, and gradual return to light activity within the protocol.\par \par Recommendations:\par \par 1. Continue PT per protocol; Progress to terminal ROM as tolerated. Continue hamstring/quad strengthening, advance closed chain exercises, proprioception exercise. Begin running/impact loading program. Progression of sport specific endurance/strengthening/sport specific drills. Goal of return to sport at 9-12mos following confirmation of strength, confidence and agility with completion of a RTP program.\par 2. Brace: OTC knee sleeve as needed\par 3. Meds: PRN use NSAIDs/Tylenol\par 4. Continue symptommatic Ice/elevate as needed\par 5. Restrictions: None\par \par Followup in 3 mos. \par  [de-identified] : 63-year-old female status post left knee ACL reconstruction (BTB allo) 4.19.19. She is doing well. She transitioned into a functional brace. Denies post op complication. Attending PT 2 x per week. Is having some trouble walking down steps still. No pain reported.

## 2019-09-03 NOTE — PHYSICAL EXAM
[de-identified] : Left knee exam\par \par Skin: Incision(s) clean, dry, intact, no drainage, healed\par Inspection: Mild swelling, min residual effusion \par Pulses: 2+ DP/PT pulses\par ROM: 0-130\par Tenderness: Min throughout\par Stability: Stable, IA lachman\par Strength: 4/5 Q/H 5/5 TA/GS/EHL quad atrophy\par Neuro: In tact to light touch throughout. [de-identified] : \par The following radiographs were ordered and read by me during this patients visit. I reviewed each radiograph in detail with the patient and discussed the findings as highlighted below. \par \par 3 views of the left knee were obtained today that show anatomic anterior cruciate ligament reconstruction with bone tendon bone allograft. No evidence of hardware failure, otherwise unremarkable.

## 2019-09-03 NOTE — PHYSICAL EXAM
[de-identified] : Left knee exam\par \par Skin: Incision(s) clean, dry, intact, no drainage, healed\par Inspection: Mild swelling, min residual effusion \par Pulses: 2+ DP/PT pulses\par ROM: 0-130\par Tenderness: Min throughout\par Stability: Stable, IA lachman\par Strength: 4/5 Q/H 5/5 TA/GS/EHL quad atrophy\par Neuro: In tact to light touch throughout. [de-identified] : \par The following radiographs were ordered and read by me during this patients visit. I reviewed each radiograph in detail with the patient and discussed the findings as highlighted below. \par \par 3 views of the left knee were obtained today that show anatomic anterior cruciate ligament reconstruction with bone tendon bone allograft. No evidence of hardware failure, otherwise unremarkable.

## 2019-09-03 NOTE — DISCUSSION/SUMMARY
[de-identified] : 63-year-old female status post left knee ACL reconstruction (BTB allo) \par \par Patient is doing well 4 months status post ACL reconstruction. Still has some limitations of ADLs, but symptoms are improving. Patient has persistent with physical therapy and home exercise program. \par \par Recommendation:\par 1. Continue PT protocol \par 2. Brace: Functional bracing \par 3. Meds: PRN use NSAIDs/Tylenol\par 4. Continue symptommatic Ice/elevate as needed\par 5. Restrictions: None\par \par Followup in 3 mos.

## 2019-09-30 ENCOUNTER — OUTPATIENT (OUTPATIENT)
Dept: OUTPATIENT SERVICES | Facility: HOSPITAL | Age: 64
LOS: 1 days | End: 2019-09-30
Payer: COMMERCIAL

## 2019-09-30 ENCOUNTER — APPOINTMENT (OUTPATIENT)
Dept: MAMMOGRAPHY | Facility: HOSPITAL | Age: 64
End: 2019-09-30
Payer: COMMERCIAL

## 2019-09-30 ENCOUNTER — APPOINTMENT (OUTPATIENT)
Dept: ULTRASOUND IMAGING | Facility: HOSPITAL | Age: 64
End: 2019-09-30
Payer: COMMERCIAL

## 2019-09-30 DIAGNOSIS — Z98.890 OTHER SPECIFIED POSTPROCEDURAL STATES: Chronic | ICD-10-CM

## 2019-09-30 DIAGNOSIS — Z00.8 ENCOUNTER FOR OTHER GENERAL EXAMINATION: ICD-10-CM

## 2019-09-30 PROCEDURE — 76641 ULTRASOUND BREAST COMPLETE: CPT

## 2019-09-30 PROCEDURE — 77067 SCR MAMMO BI INCL CAD: CPT

## 2019-09-30 PROCEDURE — 77065 DX MAMMO INCL CAD UNI: CPT | Mod: 26,GG,RT

## 2019-09-30 PROCEDURE — 77067 SCR MAMMO BI INCL CAD: CPT | Mod: 26,59

## 2019-09-30 PROCEDURE — 77063 BREAST TOMOSYNTHESIS BI: CPT | Mod: 26,59

## 2019-09-30 PROCEDURE — 76641 ULTRASOUND BREAST COMPLETE: CPT | Mod: 26,50

## 2019-09-30 PROCEDURE — 77065 DX MAMMO INCL CAD UNI: CPT

## 2019-09-30 PROCEDURE — 77063 BREAST TOMOSYNTHESIS BI: CPT

## 2019-10-09 ENCOUNTER — APPOINTMENT (OUTPATIENT)
Dept: ORTHOPEDIC SURGERY | Facility: CLINIC | Age: 64
End: 2019-10-09
Payer: COMMERCIAL

## 2019-10-09 ENCOUNTER — APPOINTMENT (OUTPATIENT)
Dept: ORTHOPEDIC SURGERY | Facility: CLINIC | Age: 64
End: 2019-10-09

## 2019-10-09 PROCEDURE — 99213 OFFICE O/P EST LOW 20 MIN: CPT

## 2019-12-19 ENCOUNTER — APPOINTMENT (OUTPATIENT)
Dept: ORTHOPEDIC SURGERY | Facility: CLINIC | Age: 64
End: 2019-12-19
Payer: COMMERCIAL

## 2019-12-19 PROCEDURE — 99213 OFFICE O/P EST LOW 20 MIN: CPT

## 2019-12-19 NOTE — DISCUSSION/SUMMARY
[de-identified] : 64-year-old female status post left knee ACL reconstruction (BTB allo) \par \par Patient is doing well 9 months status post ACL reconstruction. Still continues to have some limitations of ADLs, but symptoms are improving. Patient is persistent with physical therapy and home exercise program. \par \par Recommendation:\par 1. Continue HEP, PT. Begin treadmill exercises.\par 2. Brace: Functional bracing as needed\par 3. Meds: PRN use NSAIDs/Tylenol\par 4. Continue symptomatic Ice/elevate as needed\par 5. Restrictions: None\par \par Followup in 3 mos.

## 2019-12-19 NOTE — ADDENDUM
[FreeTextEntry1] : This note was written by Aye Jara on 12/19/2019 acting solely as a scribe for Dr. Carson Machado.\par \par All medical record entries made by the Scribe were at my, Dr. Carson Machado, direction and personally dictated by me on 12/19/2019. I have personally reviewed the chart and agree that the record accurately reflects my personal performance of the history, physical exam, assessment and plan.\par

## 2019-12-19 NOTE — PHYSICAL EXAM
[de-identified] : Oriented to time, place, person\par Mood: Normal\par Affect: Normal\par Appearance: Healthy, well appearing, no acute distress.\par Gait: Normal\par Assistive Devices: None\par \par Left knee exam\par \par Skin: Incision(s) clean, dry, intact, no drainage, healed\par Inspection: No swelling, no residual effusion \par Pulses: 2+ DP/PT pulses\par ROM: 0-135\par Tenderness: None\par Stability: Stable, IA lachman\par Strength: 4+/5 Q/H 5/5 TA/GS/EHL, min quad atrophy\par Neuro: In tact to light touch throughout.

## 2020-04-28 ENCOUNTER — OUTPATIENT (OUTPATIENT)
Dept: OUTPATIENT SERVICES | Facility: HOSPITAL | Age: 65
LOS: 1 days | End: 2020-04-28
Payer: COMMERCIAL

## 2020-04-28 ENCOUNTER — APPOINTMENT (OUTPATIENT)
Dept: ULTRASOUND IMAGING | Facility: HOSPITAL | Age: 65
End: 2020-04-28
Payer: COMMERCIAL

## 2020-04-28 DIAGNOSIS — Z00.8 ENCOUNTER FOR OTHER GENERAL EXAMINATION: ICD-10-CM

## 2020-04-28 DIAGNOSIS — Z98.890 OTHER SPECIFIED POSTPROCEDURAL STATES: Chronic | ICD-10-CM

## 2020-04-28 PROCEDURE — 76641 ULTRASOUND BREAST COMPLETE: CPT | Mod: 26

## 2020-04-28 PROCEDURE — 76641 ULTRASOUND BREAST COMPLETE: CPT

## 2020-05-11 ENCOUNTER — APPOINTMENT (OUTPATIENT)
Dept: ORTHOPEDIC SURGERY | Facility: CLINIC | Age: 65
End: 2020-05-11
Payer: COMMERCIAL

## 2020-05-11 VITALS — TEMPERATURE: 97.6 F

## 2020-05-11 DIAGNOSIS — S83.512D SPRAIN OF ANTERIOR CRUCIATE LIGAMENT OF LEFT KNEE, SUBSEQUENT ENCOUNTER: ICD-10-CM

## 2020-05-11 DIAGNOSIS — S83.232D COMPLEX TEAR OF MEDIAL MENISCUS, CURRENT INJURY, LEFT KNEE, SUBSEQUENT ENCOUNTER: ICD-10-CM

## 2020-05-11 PROCEDURE — 73564 X-RAY EXAM KNEE 4 OR MORE: CPT | Mod: LT

## 2020-05-11 PROCEDURE — 99213 OFFICE O/P EST LOW 20 MIN: CPT

## 2020-05-11 NOTE — HISTORY OF PRESENT ILLNESS
[9] : a maximum pain level of 9/10 [de-identified] : 64-year-old female presents status post left knee ACL reconstruction (BTB allo) 4.19.19. Reports occasional stiffness and achiness in the knee. Also reporting left knee "catching" in extension. She is doing well. She had not exercised since pandemic started. Denies post op complication. No pain reported. Denies instability. Unable to run like she once did, but is happy with result.

## 2020-05-11 NOTE — DISCUSSION/SUMMARY
[de-identified] : 64-year-old female status post left knee ACL reconstruction (BTB allo) \par \par Continues to do well following ACL reconstruction.  Patient has been unable to go to physical therapy, and still has some limitations with high-impact activity.  However, ADLs and low impact exercise have been manageable.  Denies any instability, and patient has good stability on examination today.\par \par Recommendations:\par 1. Continue HEP.\par 2. Brace: Functional bracing as needed\par 3. Meds: PRN use NSAIDs/Tylenol\par 4. Continue symptomatic Ice/elevate as needed\par 5. Restrictions: None\par \par Followup prn

## 2020-05-11 NOTE — REASON FOR VISIT
[Follow-Up Visit] : a follow-up visit for [Aftercare Following Surgery] : aftercare following surgery

## 2020-05-11 NOTE — PHYSICAL EXAM
[de-identified] : Oriented to time, place, person\par Mood: Normal\par Affect: Normal\par Appearance: Healthy, well appearing, no acute distress.\par Gait: Normal\par Assistive Devices: None\par \par Left knee exam\par \par Skin: Incision(s) clean, dry, intact, no drainage, healed\par Inspection: No swelling, no residual effusion \par Pulses: 2+ DP/PT pulses\par ROM: 0-135\par Tenderness: None\par Stability: Stable, IA lachman\par Strength: 4+/5 Q/H 5/5 TA/GS/EHL, min quad atrophy\par Neuro: In tact to light touch throughout. [de-identified] : \par The following radiographs were ordered and read by me during this patients visit. I reviewed each radiograph in detail with the patient and discussed the findings as highlighted below. \par \par 3 views of the left knee were obtained today that show interval change of an anatomic anterior cruciate ligament reconstruction with BTB allo. No evidence of hardware failure, otherwise unremarkable.

## 2020-08-19 ENCOUNTER — APPOINTMENT (OUTPATIENT)
Dept: RADIOLOGY | Facility: HOSPITAL | Age: 65
End: 2020-08-19
Payer: COMMERCIAL

## 2020-08-19 ENCOUNTER — OUTPATIENT (OUTPATIENT)
Dept: OUTPATIENT SERVICES | Facility: HOSPITAL | Age: 65
LOS: 1 days | End: 2020-08-19
Payer: COMMERCIAL

## 2020-08-19 DIAGNOSIS — Z00.8 ENCOUNTER FOR OTHER GENERAL EXAMINATION: ICD-10-CM

## 2020-08-19 DIAGNOSIS — Z98.890 OTHER SPECIFIED POSTPROCEDURAL STATES: Chronic | ICD-10-CM

## 2020-08-19 PROCEDURE — 77080 DXA BONE DENSITY AXIAL: CPT | Mod: 26

## 2020-08-19 PROCEDURE — 77080 DXA BONE DENSITY AXIAL: CPT

## 2020-09-24 NOTE — ED ADULT NURSE NOTE - CAS EDN DISCHARGE ASSESSMENT
Start:  Albuterol/Proair/Ventolin inhaler (w mask and spacer) 4 puffs every 4-6 hours for the next 48 hours then as needed.    Can skip night-time treatments if patient sleeping well.    Give other medications as prescribed.    If patient has significant increased work of breathing, treatments not helping or requiring treatments more than every hour, blue discoloration of lips/finger tips, unable to speak in complete sentences, or any other major concerns, call 911 or go to nearest emergency room !!  _________________________________________________________________________________  Well-Child Checkup: 11 to 13 Years    Between ages 11 and 13, your child will grow and change a lot. It’s important to keep having yearly checkups so the healthcare provider can track this progress. As your child enters puberty, he or she may become more embarrassed about having a checkup. Reassure your child that the exam is normal and necessary. Be aware that the healthcare provider may ask to talk with the child without you in the exam room.  School and social issues  Here are some topics you, your child, and the healthcare provider may want to discuss during this visit:  · School performance. How is your child doing in school? Is homework finished on time? Does your child stay organized? These are skills you can help with. Keep in mind that a drop in school performance can be a sign of other problems.  · Friendships. Do you like your child’s friends? Do the friendships seem healthy? Make sure to talk to your child about who his or her friends are and how they spend time together. This is the age when peer pressure can start to be a problem.  · Life at home. How is your child’s behavior? Does he or she get along with others in the family? Is he or she respectful of you, other adults, and authority? Does your child participate in family events, or does he or she withdraw from other family members?  · Risky behaviors. It’s not too early  to start talking to your child about drugs, alcohol, smoking, and sex. Make sure your child understands that these are not activities he or she should do, even if friends are. Answer your child’s questions, and don’t be afraid to ask questions of your own. Make sure your child knows he or she can always come to you for help. If you’re not sure how to approach these topics, talk to the healthcare provider for advice.  Entering puberty  Puberty is the stage when a child begins to develop sexually into an adult. It usually starts between 9 and 14 for girls, and between 12 and 16 for boys. Here is some of what you can expect when puberty begins:  · Acne and body odor. Hormones that increase during puberty can cause acne (pimples) on the face and body. Hormones can also increase sweating and cause a stronger body odor. At this age, your child should begin to shower or bathe daily. Encourage your child to use deodorant and acne products as needed.  · Body changes in girls. Early in puberty, breasts begin to develop. One breast often starts to grow before the other. This is normal. Hair begins to grow in the pubic area, under the arms, and on the legs. Around 2 years after breasts begin to grow, a girl will start having monthly periods (menstruation). To help prepare your daughter for this change, talk to her about periods, what to expect, and how to use feminine products.  · Body changes in boys. At the start of puberty, the testicles drop lower and the scrotum darkens and becomes looser. Hair begins to grow in the pubic area, under the arms, and on the legs, chest, and face. The voice changes, becoming lower and deeper. As the penis grows and matures, erections and “wet dreams” begin to happen. Reassure your son that this is normal.  · Emotional changes. Along with these physical changes, you’ll likely notice changes in your child’s personality. You may notice your child developing an interest in dating and becoming  “more than friends” with others. Also, many kids become mccarthy and develop an attitude around puberty. This can be frustrating, but it is very normal. Try to be patient and consistent. Encourage conversations, even when your child doesn’t seem to want to talk. No matter how your child acts, he or she still needs a parent.  Nutrition and exercise tips  Today, kids are less active and eat more junk food than ever before. Your child is starting to make choices about what to eat and how active to be. You can’t always have the final say, but you can help your child develop healthy habits. Here are some tips:  · Help your child get at least 30 to 60 minutes of activity every day. The time can be broken up throughout the day. If the weather’s bad or you’re worried about safety, find supervised indoor activities.   · Limit “screen time” to 1 hour each day. This includes time spent watching TV, playing video games, using the computer, and texting. If your child has a TV, computer, or video game console in the bedroom, consider replacing it with a music player. For many kids, dancing and singing are fun ways to get moving.  · Limit sugary drinks. Soda, juice, and sports drinks lead to unhealthy weight gain and tooth decay. Water and low-fat or nonfat milk are best to drink. In moderation (no more than 8 to 12 ounces daily), 100% fruit juice is OK. Save soda and other sugary drinks for special occasions.  · Have at least one family meal together each day. Busy schedules often limit time for sitting and talking. Sitting and eating together allows for family time. It also lets you see what and how your child eats.  · Pay attention to portions. Serve portions that make sense for your kids. Let them stop eating when they’re full--don’t make them clean their plates. Be aware that many kids’ appetites increase during puberty. If your child is still hungry after a meal, offer seconds of vegetables or fruit.  · Serve and encourage  healthy foods. Your child is making more food decisions on his or her own. All foods have a place in a balanced diet. Fruits, vegetables, lean meats, and whole grains should be eaten every day. Save less healthy foods--like french fries, candy, and chips--for a special occasion. When your child does choose to eat junk food, consider making the child buy it with his or her own money. Ask your child to tell you when he or she buys junk food or swaps food with friends.  · Bring your child to the dentist at least twice a year for teeth cleaning and a checkup.  Sleeping tips  At this age, your child needs about 10 hours of sleep each night. Here are some tips:  · Set a bedtime and make sure your child follows it each night.  · TV, computer, and video games can agitate a child and make it hard to calm down for the night. Turn them off the at least an hour before bed. Instead, encourage your child to read before bed.  · If your child has a cell phone, make sure it’s turned off at night.  · Don’t let your child go to sleep very late or sleep in on weekends. This can disrupt sleep patterns and make it harder to sleep on school nights.  · Remind your child to brush and floss his or her teeth before bed. Briefly supervise your child's dental self-care once a week to make sure of proper technique.  Safety tips  Recommendations for keeping your child safe include the following:   · When riding a bike, roller-skating, or using a scooter or skateboard, your child should wear a helmet with the strap fastened. When using roller skates, a scooter, or a skateboard, it is also a good idea for your child to wear wrist guards, elbow pads, and knee pads.  · In the car, all children younger than 13 should sit in the back seat. Children shorter than 4'9\" (57 inches) should continue to use a booster seat to properly position the seat belt.  · If your child has a cell phone or portable music player, make sure these are used safely and  responsibly. Do not allow your child to talk on the phone, text, or listen to music with headphones while he or she is riding a bike or walking outdoors. Remind your child to pay special attention when crossing the street.  · Constant loud music can cause hearing damage, so monitor the volume on your child’s music player. Many players let you set a limit for how loud the volume can be turned up. Check the directions for details.  · At this age, kids may start taking risks that could be dangerous to their health or well-being. Sometimes bad decisions stem from peer pressure. Other times, kids just don’t think ahead about what could happen. Teach your child the importance of making good decisions. Talk about how to recognize peer pressure and come up with strategies for coping with it.  · Sudden changes in your child’s mood, behavior, friendships, or activities can be warning signs of problems at school or in other aspects of your child’s life. If you notice signs like these, talk to your child and to the staff at your child’s school. The healthcare provider may also be able to offer advice.  Vaccines  Based on recommendations from the American Association of Pediatrics, at this visit your child may receive the following vaccines:  · Human papillomavirus (HPV) (ages 11 to 12)  · Influenza (flu), annually  · Meningococcal (ages 11 to 12)  · Tetanus, diphtheria, and pertussis (ages 11 to 12)  Stay on top of social media  In this wired age, kids are much more “connected” with friends--possibly some they’ve never met in person. To teach your child how to use social media responsibly:  · Set limits for the use of cell phones, the computer, and the Internet. Remind your child that you can check the web browser history and cell phone logs to know how these devices are being used. Use parental controls and passwords to block access to inappropriate websites. Use privacy settings on websites so only your child’s friends can  view his or her profile.  · Explain to your child the dangers of giving out personal information online. Teach your child not to share his or her phone number, address, picture, or other personal details with online friends without your permission.  · Make sure your child understands that things he or she “says” on the Internet are never private. Posts made on websites like Facebook, [employer], and [employer] can be seen by people they weren’t intended for. Posts can easily be misunderstood and can even cause trouble for you or your child. Supervise your child’s use of social networks, chat rooms, and email.      Next checkup at: _______________________________     PARENT NOTES:  Date Last Reviewed: 12/1/2016  © 8350-2798 [employer]. [address] [place] [address], [place] [address]. All rights reserved. This information is not intended as a substitute for professional medical care. Always follow your healthcare professional's instructions.            No adverse reaction to first time med in ED/Alert and oriented to person, place and time

## 2020-11-03 ENCOUNTER — TRANSCRIPTION ENCOUNTER (OUTPATIENT)
Age: 65
End: 2020-11-03

## 2020-11-04 ENCOUNTER — EMERGENCY (EMERGENCY)
Facility: HOSPITAL | Age: 65
LOS: 1 days | Discharge: HOME | End: 2020-11-04
Attending: INTERNAL MEDICINE | Admitting: INTERNAL MEDICINE
Payer: COMMERCIAL

## 2020-11-04 VITALS
SYSTOLIC BLOOD PRESSURE: 160 MMHG | HEIGHT: 61 IN | HEART RATE: 78 BPM | OXYGEN SATURATION: 100 % | WEIGHT: 141.1 LBS | DIASTOLIC BLOOD PRESSURE: 91 MMHG | RESPIRATION RATE: 16 BRPM | TEMPERATURE: 98 F

## 2020-11-04 DIAGNOSIS — S01.81XA LACERATION WITHOUT FOREIGN BODY OF OTHER PART OF HEAD, INITIAL ENCOUNTER: ICD-10-CM

## 2020-11-04 DIAGNOSIS — Z98.890 OTHER SPECIFIED POSTPROCEDURAL STATES: Chronic | ICD-10-CM

## 2020-11-04 PROCEDURE — 99284 EMERGENCY DEPT VISIT MOD MDM: CPT | Mod: 25

## 2020-11-04 PROCEDURE — 14040 TIS TRNFR F/C/C/M/N/A/G/H/F: CPT

## 2020-11-04 PROCEDURE — 99284 EMERGENCY DEPT VISIT MOD MDM: CPT

## 2020-11-04 NOTE — ED PROVIDER NOTE - CLINICAL SUMMARY MEDICAL DECISION MAKING FREE TEXT BOX
pt 64y f p/w laceration of forehead after being hit in the head by a power tool that was sharp. it got caught in her shirt when it was spinning it hit her.  denies pain, ha, loc, dizziness, n/v, weakness  lac requesting plastics. plastics repaired and will fu next week

## 2020-11-04 NOTE — ED ADULT NURSE NOTE - PMH
Hypothyroid    Ligament tear  left ACL in 2019  Prediabetes  diagnosed in 2018  Tear of meniscus of knee  partial medial meniscus

## 2020-11-04 NOTE — ED PROVIDER NOTE - ATTENDING CONTRIBUTION TO CARE
pt 64y f p/w laceration of forehead after being hit in the head by a power tool that was sharp. it got caught in her shirt when it was spinning it hit her.  denies pain, ha, loc, dizziness, n/v, weakness  lac requesting plastics. plastics repaired and will fu next week  Dr. Ramos:  I have reviewed and discussed with the PA/ resident the case specifics, including the history, physical assessment, evaluation, conclusion, laboratory results, and medical plan. I agree with the contents, and conclusions. I have personally examined, and interviewed the patient.

## 2020-11-04 NOTE — ED ADULT NURSE NOTE - PSH
Delivery by elective  section  x 2 ( and )  H/O carpal tunnel repair  left hand in  and right hand in 2009

## 2020-11-04 NOTE — ED ADULT NURSE REASSESSMENT NOTE - NS ED NURSE REASSESS COMMENT FT1
Report received from LOUISA Brewster, patient resting comfortably. No acute distress noted. Awaiting plastics. Will continue to monitor.

## 2020-11-04 NOTE — ED PROVIDER NOTE - NSFOLLOWUPINSTRUCTIONS_ED_ALL_ED_FT
Laceration    A laceration is a cut that goes through all of the layers of the skin and into the tissue that is right under the skin. Some lacerations heal on their own. Others need to be closed with skin adhesive strips, skin glue, stitches (sutures), or staples. Proper laceration care minimizes the risk of infection and helps the laceration to heal better.  If non-absorbable stitches or staples have been placed, they must be taken out within the time frame instructed by your healthcare provider.    SEEK IMMEDIATE MEDICAL CARE IF YOU HAVE ANY OF THE FOLLOWING SYMPTOMS: swelling around the wound, worsening pain, drainage from the wound, red streaking going away from your wound, inability to move finger or toe near the laceration, or discoloration of skin near the laceration.       Follow up with your primary care provider within 48-72 hours for wound check.   Take tylenol as needed for pain  Follow up next week with:  Dr. Vicente Son  [address] [place] [address] [address]  [place] [address]  [phone]  Fax: [phone]   Keep sutures covered and dry for 24 hours  Any increased pain, redness, streaking (red lines), swelling, fever, chills return to ER.

## 2020-11-04 NOTE — ED PROVIDER NOTE - PHYSICAL EXAMINATION
General:     NAD, well-nourished, well-appearing  Spine: no tenderness  Eyes: PERRL  Head:     EOMI, oral mucosa moist  Neck:     trachea midline  Lungs:     CTA b/l  CVS:     RRR  Abd:     +BS, s/nt/nd  Ext:   no deformities   Neuro: AAOx3, normal gait  Skin: 1 cm laceration vertical mid forehead

## 2020-11-04 NOTE — ED PROVIDER NOTE - OBJECTIVE STATEMENT
pt 64y f p/w laceration of forehead pt 64y f p/w laceration of forehead after being hit in the head by a power tool that was sharp. it got caught in her shirt when it was spinning it hit her.  denies pain, ha, loc, dizziness, n/v, weakness

## 2020-11-04 NOTE — ED PROVIDER NOTE - PATIENT PORTAL LINK FT
You can access the FollowMyHealth Patient Portal offered by [facility] by registering at the following website: [email or web address]. By joining [employer]’s FollowMyHealth portal, you will also be able to view your health information using other applications (apps) compatible with our system.

## 2021-03-24 NOTE — H&P PST ADULT - NSICDXPROBLEM_GEN_ALL_CORE_FT
Quality 130: Documentation Of Current Medications In The Medical Record: Current Medications Documented Detail Level: Detailed PROBLEM DIAGNOSES  Problem: Ligament tear  Assessment and Plan: This is a  62 y/o female who is scheduled for left knee anterior cruciate ligament reconstruction with bone to bone allograft, partial medial  meniscectomy on 4-19-19  * Given pre op instructions, scrub cleanser, and famotidine  * Instructed to take normal am dose of synthroid the am of surgery

## 2021-05-11 NOTE — ED PROCEDURE NOTE - CPROC ED POST RADIOGRAPHY1
post procedure radiography not performed/extremity correctly positioned, splinted Number Of Stages Required To Clear Tumor (Optional): 2

## 2021-06-28 ENCOUNTER — OUTPATIENT (OUTPATIENT)
Dept: OUTPATIENT SERVICES | Facility: HOSPITAL | Age: 66
LOS: 1 days | End: 2021-06-28
Payer: COMMERCIAL

## 2021-06-28 ENCOUNTER — APPOINTMENT (OUTPATIENT)
Dept: MAMMOGRAPHY | Facility: HOSPITAL | Age: 66
End: 2021-06-28
Payer: COMMERCIAL

## 2021-06-28 ENCOUNTER — APPOINTMENT (OUTPATIENT)
Dept: ULTRASOUND IMAGING | Facility: HOSPITAL | Age: 66
End: 2021-06-28
Payer: COMMERCIAL

## 2021-06-28 DIAGNOSIS — Z00.8 ENCOUNTER FOR OTHER GENERAL EXAMINATION: ICD-10-CM

## 2021-06-28 DIAGNOSIS — Z98.890 OTHER SPECIFIED POSTPROCEDURAL STATES: Chronic | ICD-10-CM

## 2021-06-28 PROCEDURE — 77067 SCR MAMMO BI INCL CAD: CPT

## 2021-06-28 PROCEDURE — 76641 ULTRASOUND BREAST COMPLETE: CPT | Mod: 26,50

## 2021-06-28 PROCEDURE — 77063 BREAST TOMOSYNTHESIS BI: CPT | Mod: 26

## 2021-06-28 PROCEDURE — 77067 SCR MAMMO BI INCL CAD: CPT | Mod: 26

## 2021-06-28 PROCEDURE — 76641 ULTRASOUND BREAST COMPLETE: CPT

## 2021-06-28 PROCEDURE — 77063 BREAST TOMOSYNTHESIS BI: CPT

## 2021-11-14 ENCOUNTER — TRANSCRIPTION ENCOUNTER (OUTPATIENT)
Age: 66
End: 2021-11-14

## 2022-07-18 NOTE — H&P PST ADULT - NEGATIVE SKIN SYMPTOMS
Render Risk Assessment In Note?: no Comment: Patient advised to monitor lesion. If spot does not go away after ln2 treatment today, return to clinic and consider biopsy. Detail Level: Simple no rash/no itching/no dryness

## 2022-07-27 NOTE — HISTORY OF PRESENT ILLNESS
[de-identified] : 64-year-old female status post left knee ACL reconstruction (BTB allo) 4.19.19. She is doing well. She continues to work on full extension. Denies post op complication. Attending PT 1 x per week. No pain reported. Denies instability. Unable to run yet.  Zyclara Pregnancy And Lactation Text: This medication is Pregnancy Category C. It is unknown if this medication is excreted in breast milk.

## 2022-09-16 ENCOUNTER — APPOINTMENT (OUTPATIENT)
Dept: ULTRASOUND IMAGING | Facility: HOSPITAL | Age: 67
End: 2022-09-16

## 2022-09-16 ENCOUNTER — OUTPATIENT (OUTPATIENT)
Dept: OUTPATIENT SERVICES | Facility: HOSPITAL | Age: 67
LOS: 1 days | End: 2022-09-16
Payer: COMMERCIAL

## 2022-09-16 ENCOUNTER — APPOINTMENT (OUTPATIENT)
Dept: MAMMOGRAPHY | Facility: HOSPITAL | Age: 67
End: 2022-09-16

## 2022-09-16 DIAGNOSIS — Z00.8 ENCOUNTER FOR OTHER GENERAL EXAMINATION: ICD-10-CM

## 2022-09-16 DIAGNOSIS — Z98.890 OTHER SPECIFIED POSTPROCEDURAL STATES: Chronic | ICD-10-CM

## 2022-09-16 PROCEDURE — 77067 SCR MAMMO BI INCL CAD: CPT | Mod: 26

## 2022-09-16 PROCEDURE — 77067 SCR MAMMO BI INCL CAD: CPT

## 2022-09-16 PROCEDURE — 77063 BREAST TOMOSYNTHESIS BI: CPT | Mod: 26

## 2022-09-16 PROCEDURE — 76641 ULTRASOUND BREAST COMPLETE: CPT | Mod: 26,50

## 2022-09-16 PROCEDURE — 76641 ULTRASOUND BREAST COMPLETE: CPT

## 2022-09-16 PROCEDURE — 77063 BREAST TOMOSYNTHESIS BI: CPT

## 2022-12-28 ENCOUNTER — EMERGENCY (EMERGENCY)
Facility: HOSPITAL | Age: 67
LOS: 1 days | Discharge: HOME | End: 2022-12-28
Attending: EMERGENCY MEDICINE | Admitting: EMERGENCY MEDICINE
Payer: COMMERCIAL

## 2022-12-28 VITALS
WEIGHT: 147.05 LBS | SYSTOLIC BLOOD PRESSURE: 116 MMHG | DIASTOLIC BLOOD PRESSURE: 73 MMHG | TEMPERATURE: 97 F | OXYGEN SATURATION: 98 % | HEIGHT: 62 IN | RESPIRATION RATE: 18 BRPM | HEART RATE: 93 BPM

## 2022-12-28 VITALS
SYSTOLIC BLOOD PRESSURE: 109 MMHG | RESPIRATION RATE: 16 BRPM | HEART RATE: 79 BPM | TEMPERATURE: 98 F | OXYGEN SATURATION: 97 % | DIASTOLIC BLOOD PRESSURE: 62 MMHG

## 2022-12-28 DIAGNOSIS — Z98.890 OTHER SPECIFIED POSTPROCEDURAL STATES: Chronic | ICD-10-CM

## 2022-12-28 LAB
ALBUMIN SERPL ELPH-MCNC: 4.5 G/DL — SIGNIFICANT CHANGE UP (ref 3.3–5)
ALP SERPL-CCNC: 82 U/L — SIGNIFICANT CHANGE UP (ref 40–120)
ALT FLD-CCNC: 30 U/L — SIGNIFICANT CHANGE UP (ref 10–45)
ANION GAP SERPL CALC-SCNC: 11 MMOL/L — SIGNIFICANT CHANGE UP (ref 5–17)
AST SERPL-CCNC: 27 U/L — SIGNIFICANT CHANGE UP (ref 10–40)
BASOPHILS # BLD AUTO: 0.04 K/UL — SIGNIFICANT CHANGE UP (ref 0–0.2)
BASOPHILS NFR BLD AUTO: 0.3 % — SIGNIFICANT CHANGE UP (ref 0–2)
BILIRUB SERPL-MCNC: 0.6 MG/DL — SIGNIFICANT CHANGE UP (ref 0.2–1.2)
BUN SERPL-MCNC: 24 MG/DL — HIGH (ref 7–23)
CALCIUM SERPL-MCNC: 10 MG/DL — SIGNIFICANT CHANGE UP (ref 8.4–10.5)
CHLORIDE SERPL-SCNC: 101 MMOL/L — SIGNIFICANT CHANGE UP (ref 96–108)
CO2 SERPL-SCNC: 25 MMOL/L — SIGNIFICANT CHANGE UP (ref 22–31)
CREAT SERPL-MCNC: 0.96 MG/DL — SIGNIFICANT CHANGE UP (ref 0.5–1.3)
EGFR: 65 ML/MIN/1.73M2 — SIGNIFICANT CHANGE UP
EOSINOPHIL # BLD AUTO: 0.05 K/UL — SIGNIFICANT CHANGE UP (ref 0–0.5)
EOSINOPHIL NFR BLD AUTO: 0.4 % — SIGNIFICANT CHANGE UP (ref 0–6)
GLUCOSE SERPL-MCNC: 206 MG/DL — HIGH (ref 70–99)
HCT VFR BLD CALC: 42.3 % — SIGNIFICANT CHANGE UP (ref 34.5–45)
HGB BLD-MCNC: 14.3 G/DL — SIGNIFICANT CHANGE UP (ref 11.5–15.5)
IMM GRANULOCYTES NFR BLD AUTO: 0.2 % — SIGNIFICANT CHANGE UP (ref 0–0.9)
LIDOCAIN IGE QN: 99 U/L — SIGNIFICANT CHANGE UP (ref 73–393)
LYMPHOCYTES # BLD AUTO: 0.88 K/UL — LOW (ref 1–3.3)
LYMPHOCYTES # BLD AUTO: 7.3 % — LOW (ref 13–44)
MCHC RBC-ENTMCNC: 30.2 PG — SIGNIFICANT CHANGE UP (ref 27–34)
MCHC RBC-ENTMCNC: 33.8 GM/DL — SIGNIFICANT CHANGE UP (ref 32–36)
MCV RBC AUTO: 89.2 FL — SIGNIFICANT CHANGE UP (ref 80–100)
MONOCYTES # BLD AUTO: 0.61 K/UL — SIGNIFICANT CHANGE UP (ref 0–0.9)
MONOCYTES NFR BLD AUTO: 5 % — SIGNIFICANT CHANGE UP (ref 2–14)
NEUTROPHILS # BLD AUTO: 10.51 K/UL — HIGH (ref 1.8–7.4)
NEUTROPHILS NFR BLD AUTO: 86.8 % — HIGH (ref 43–77)
NRBC # BLD: 0 /100 WBCS — SIGNIFICANT CHANGE UP (ref 0–0)
PLATELET # BLD AUTO: 289 K/UL — SIGNIFICANT CHANGE UP (ref 150–400)
POTASSIUM SERPL-MCNC: 3.9 MMOL/L — SIGNIFICANT CHANGE UP (ref 3.5–5.3)
POTASSIUM SERPL-SCNC: 3.9 MMOL/L — SIGNIFICANT CHANGE UP (ref 3.5–5.3)
PROT SERPL-MCNC: 8.2 G/DL — SIGNIFICANT CHANGE UP (ref 6–8.3)
RBC # BLD: 4.74 M/UL — SIGNIFICANT CHANGE UP (ref 3.8–5.2)
RBC # FLD: 12.7 % — SIGNIFICANT CHANGE UP (ref 10.3–14.5)
SARS-COV-2 RNA SPEC QL NAA+PROBE: SIGNIFICANT CHANGE UP
SODIUM SERPL-SCNC: 137 MMOL/L — SIGNIFICANT CHANGE UP (ref 135–145)
WBC # BLD: 12.12 K/UL — HIGH (ref 3.8–10.5)
WBC # FLD AUTO: 12.12 K/UL — HIGH (ref 3.8–10.5)

## 2022-12-28 PROCEDURE — 36415 COLL VENOUS BLD VENIPUNCTURE: CPT

## 2022-12-28 PROCEDURE — 96375 TX/PRO/DX INJ NEW DRUG ADDON: CPT

## 2022-12-28 PROCEDURE — 83690 ASSAY OF LIPASE: CPT

## 2022-12-28 PROCEDURE — 87635 SARS-COV-2 COVID-19 AMP PRB: CPT

## 2022-12-28 PROCEDURE — 99284 EMERGENCY DEPT VISIT MOD MDM: CPT | Mod: 25

## 2022-12-28 PROCEDURE — 85025 COMPLETE CBC W/AUTO DIFF WBC: CPT

## 2022-12-28 PROCEDURE — 96374 THER/PROPH/DIAG INJ IV PUSH: CPT

## 2022-12-28 PROCEDURE — 80053 COMPREHEN METABOLIC PANEL: CPT

## 2022-12-28 PROCEDURE — 96361 HYDRATE IV INFUSION ADD-ON: CPT

## 2022-12-28 PROCEDURE — 99284 EMERGENCY DEPT VISIT MOD MDM: CPT

## 2022-12-28 RX ORDER — FAMOTIDINE 10 MG/ML
20 INJECTION INTRAVENOUS ONCE
Refills: 0 | Status: COMPLETED | OUTPATIENT
Start: 2022-12-28 | End: 2022-12-28

## 2022-12-28 RX ORDER — ONDANSETRON 8 MG/1
1 TABLET, FILM COATED ORAL
Qty: 20 | Refills: 0
Start: 2022-12-28

## 2022-12-28 RX ORDER — ONDANSETRON 8 MG/1
4 TABLET, FILM COATED ORAL ONCE
Refills: 0 | Status: COMPLETED | OUTPATIENT
Start: 2022-12-28 | End: 2022-12-28

## 2022-12-28 RX ORDER — SODIUM CHLORIDE 9 MG/ML
2000 INJECTION INTRAMUSCULAR; INTRAVENOUS; SUBCUTANEOUS ONCE
Refills: 0 | Status: COMPLETED | OUTPATIENT
Start: 2022-12-28 | End: 2022-12-28

## 2022-12-28 RX ADMIN — ONDANSETRON 4 MILLIGRAM(S): 8 TABLET, FILM COATED ORAL at 04:47

## 2022-12-28 RX ADMIN — SODIUM CHLORIDE 2000 MILLILITER(S): 9 INJECTION INTRAMUSCULAR; INTRAVENOUS; SUBCUTANEOUS at 04:44

## 2022-12-28 RX ADMIN — FAMOTIDINE 100 MILLIGRAM(S): 10 INJECTION INTRAVENOUS at 04:47

## 2022-12-28 NOTE — ED PROVIDER NOTE - NSFOLLOWUPINSTRUCTIONS_ED_ALL_ED_FT
- drink plenty of fluids like pedialyte, gatorade, clear soups    - take tylenol and or motrin for pain or fever as needed    - return for worse vomiting, severe pains, feeling faint, or other concerns    Follow Up in 1-3 Days with your own doctor or with  [address] [address]  Phone: [phone]    Gastroenteritis    WHAT YOU NEED TO KNOW:    Gastroenteritis, or stomach flu, is an infection of the stomach and intestines. [facility] [place]         DISCHARGE INSTRUCTIONS:    Call 911 for any of the following:     You have trouble breathing or a very fast pulse.        Return to the emergency department if:     You see blood in your diarrhea.      You cannot stop vomiting.      You have not urinated for 12 hours.       You feel like you are going to faint.    Contact your healthcare provider if:     You have a fever.      You continue to vomit or have diarrhea, even after treatment.      You see worms in your diarrhea.      Your mouth or eyes are dry. You are not urinating as much or as often.      You have questions or concerns about your condition or care.    Medicines:     Medicines may be given to stop vomiting or diarrhea, decrease abdominal cramps, or treat an infection.      Take your medicine as directed. Contact your healthcare provider if you think your medicine is not helping or if you have side effects. Tell him or her if you are allergic to any medicine. Keep a list of the medicines, vitamins, and herbs you take. Include the amounts, and when and why you take them. Bring the list or the pill bottles to follow-up visits. Carry your medicine list with you in case of an emergency.    Manage your symptoms:     Drink liquids as directed. Ask your healthcare provider how much liquid to drink each day, and which liquids are best for you. You may also need to drink an oral rehydration solution (ORS). An ORS has the right amounts of sugar, salt, and minerals in water to replace body fluids.      Eat bland foods. When you feel hungry, begin eating soft, bland foods. Examples are bananas, clear soup, potatoes, and applesauce. Do not have dairy products, alcohol, sugary drinks, or drinks with caffeine until you feel better.      Rest as much as possible. Slowly start to do more each day when you begin to feel better.    Prevent the spread of gastroenteritis: Gastroenteritis can spread easily. Keep yourself, your family, and your surroundings clean to help prevent the spread of gastroenteritis:     Wash your hands often. Use soap and water. Wash your hands after you use the bathroom, change a child's diapers, or sneeze. Wash your hands before you prepare or eat food. Handwashing           Clean surfaces and do laundry often. Wash your clothes and towels separately from the rest of the laundry. Clean surfaces in your home with antibacterial  or bleach.      Clean food thoroughly and cook safely. Wash raw vegetables before you cook. Cook meat, fish, and eggs fully. Do not use the same dishes for raw meat as you do for other foods. Refrigerate any leftover food immediately.      Be aware when you camp or travel. Drink only clean water. Do not drink from rivers or lakes unless you purify or boil the water first. When you travel, drink bottled water and do not add ice. Do not eat fruit that has not been peeled. Do not eat raw fish or meat that is not fully cooked.     Follow up with your healthcare provider as directed: Write down your questions so you remember to ask them during your visits.

## 2022-12-28 NOTE — ED PROVIDER NOTE - NSICDXPASTMEDICALHX_GEN_ALL_CORE_FT
PAST MEDICAL HISTORY:  Hypothyroid     Ligament tear left ACL in 2019    Prediabetes diagnosed in 2018    Tear of meniscus of knee partial medial meniscus

## 2022-12-28 NOTE — ED PROVIDER NOTE - CLINICAL SUMMARY MEDICAL DECISION MAKING FREE TEXT BOX
67-year-old female with nausea vomiting diarrhea tonight.  Abdomen nontender.  Most likely viral gastroenteritis.  Check labs rule out dehydration, electrolyte abnormality.  Give IV fluids, Zofran, Pepcid.  Reassess. 67-year-old female with nausea vomiting diarrhea tonight.  Abdomen nontender.  Most likely viral gastroenteritis.  Check labs rule out dehydration, electrolyte abnormality.  Give IV fluids, Zofran, Pepcid.  Reassess.    0550: pt feeling much better. no n/v. no abd pain. tolerating po fluids. likely gastroenteritis. f/u pmd. po hydration. zofran rx

## 2022-12-28 NOTE — ED PROVIDER NOTE - NSICDXFAMILYHX_GEN_ALL_CORE_FT
FAMILY HISTORY:  FH: breast cancer, sister  FH: colon cancer, father  FH: diabetes mellitus, mother

## 2022-12-28 NOTE — ED ADULT NURSE NOTE - OBJECTIVE STATEMENT
Patient c/o nausea, vomiting and diarrhea since 11pm. Patient denies chest pain, SOB, headache, dizziness and blurry vision.

## 2022-12-28 NOTE — ED PROVIDER NOTE - PATIENT PORTAL LINK FT
You can access the FollowMyHealth Patient Portal offered by [facility] by registering at the following website: [email or web address]. By joining [employer]’s FollowMyHealth portal, you will also be able to view your health information using other applications (apps) compatible with our system.

## 2022-12-28 NOTE — ED PROVIDER NOTE - OBJECTIVE STATEMENT
67-year-old female with history of hypothyroidism, no significant past surgical history presents with vomiting, severe, nonbloody nonbilious, about 10 episodes since 11 PM tonight.  Associated with watery nonbloody diarrhea numerous episodes.  No abdominal pain.  No fever.  Positive chills.  No chest pain, SOB.  No sick contacts or travel.

## 2023-01-06 NOTE — CHART NOTE - NSCHARTNOTEFT_GEN_A_CORE
SW placed call to patient to discuss and assist with follow up care.  Patient presented to ED on 12/27/22 due to nausea.  SW unable to reach patient at this time to discuss follow up care.

## 2023-06-08 ENCOUNTER — EMERGENCY (EMERGENCY)
Facility: HOSPITAL | Age: 68
LOS: 1 days | Discharge: HOME | End: 2023-06-08
Attending: INTERNAL MEDICINE | Admitting: INTERNAL MEDICINE
Payer: COMMERCIAL

## 2023-06-08 VITALS
TEMPERATURE: 98 F | RESPIRATION RATE: 17 BRPM | HEART RATE: 72 BPM | WEIGHT: 147.27 LBS | HEIGHT: 61 IN | DIASTOLIC BLOOD PRESSURE: 88 MMHG | SYSTOLIC BLOOD PRESSURE: 139 MMHG | OXYGEN SATURATION: 98 %

## 2023-06-08 VITALS
SYSTOLIC BLOOD PRESSURE: 125 MMHG | HEART RATE: 64 BPM | DIASTOLIC BLOOD PRESSURE: 76 MMHG | TEMPERATURE: 97 F | OXYGEN SATURATION: 98 %

## 2023-06-08 DIAGNOSIS — Z98.890 OTHER SPECIFIED POSTPROCEDURAL STATES: Chronic | ICD-10-CM

## 2023-06-08 PROCEDURE — 70450 CT HEAD/BRAIN W/O DYE: CPT | Mod: MA

## 2023-06-08 PROCEDURE — 70450 CT HEAD/BRAIN W/O DYE: CPT | Mod: 26,MA

## 2023-06-08 PROCEDURE — 99284 EMERGENCY DEPT VISIT MOD MDM: CPT

## 2023-06-08 PROCEDURE — 99284 EMERGENCY DEPT VISIT MOD MDM: CPT | Mod: 25

## 2023-06-08 RX ORDER — LEVOTHYROXINE SODIUM 125 MCG
1 TABLET ORAL
Qty: 0 | Refills: 0 | DISCHARGE

## 2023-06-08 RX ORDER — ACETAMINOPHEN 500 MG
650 TABLET ORAL ONCE
Refills: 0 | Status: COMPLETED | OUTPATIENT
Start: 2023-06-08 | End: 2023-06-08

## 2023-06-08 NOTE — ED PROVIDER NOTE - PHYSICAL EXAMINATION
General:     NAD, well-nourished, well-appearing  Head:     NC/ Abrasion left eyebrowHematoma of the left foreheadEOMI, oral mucosa moist  Neck:     trachea midline  Lungs:     CTA b/l, no w/r/r  CVS:     S1S2, RRR, no m/g/r  Abd:     +BS, s/nt/nd, no organomegaly  Ext:    2+ radial and pedal pulses, no c/c/e  Neuro: AAOx3, no sensory/motor deficits

## 2023-06-08 NOTE — ED ADULT TRIAGE NOTE - CHIEF COMPLAINT QUOTE
Pt tripped and fell while walking on sidewalk, c/o injury around left eye area, no LOC no blood thinner

## 2023-06-08 NOTE — ED PROVIDER NOTE - PATIENT PORTAL LINK FT
You can access the FollowMyHealth Patient Portal offered by [facility] by registering at the following website: [email or web address]. By joining [employer]’s FollowMyHealth portal, you will also be able to view your health information using other applications (apps) compatible with our system.

## 2023-06-08 NOTE — ED PROVIDER NOTE - OBJECTIVE STATEMENT
67-year-old female past history of thyroid disease on Synthroid no anticoagulation or antiplatelets came to the emergency room status post fall has a bruise on the left eyebrow and that superficial abrasion no loss of consciousness no neck or back pain no chest pain no shortness of breath patient is able to ambulate normally

## 2023-06-08 NOTE — ED PROVIDER NOTE - CLINICAL SUMMARY MEDICAL DECISION MAKING FREE TEXT BOX
67-year-old female past history of thyroid disease on Synthroid no anticoagulation or antiplatelets came to the emergency room status post fall has a bruise on the left eyebrow and that superficial abrasion no loss of consciousness no neck or back pain no chest pain no shortness of breath patient is able to ambulate normally  Plan to do CT of the head Dermabond the abrasion on the forehead patient is up-to-date with tetanus 67-year-old female past history of thyroid disease on Synthroid no anticoagulation or antiplatelets came to the emergency room status post fall has a bruise on the left eyebrow and that superficial abrasion no loss of consciousness no neck or back pain no chest pain no shortness of breath patient is able to ambulate normally  Plan to do CT of the head Dermabond the abrasion on the forehead patient is up-to-date with tetanus  CT of the head negative the wound washed and covered with Dermabond

## 2023-06-12 NOTE — CHART NOTE - NSCHARTNOTEFT_GEN_A_CORE
SW placed call to patient to discuss and assist with follow up care.  Patient presented to ED on 6/8/23 due to fall.  Patient declined SW assistance with scheduling PMD follow up at this time.  Patient encouraged to call ED SW if further SW assistance is needed.

## 2023-09-08 ENCOUNTER — APPOINTMENT (OUTPATIENT)
Dept: OBGYN | Facility: CLINIC | Age: 68
End: 2023-09-08

## 2023-09-25 NOTE — ED ADULT NURSE NOTE - NSFALLRSKASSESSDT_ED_ALL_ED
From: Sasha Flores  To: Dr. Mariann Venegas: 2023 10:51 AM EDT  Subject: Yearly MRI    Good morning! It has been a year since my last MRI. I like to get this out of the way before the holidays roll in. Could you send the order over to [facility] on [address]? Thank you!   Tara Monteiro
[employer] [phone]
04-Nov-2020 18:40

## 2023-11-03 ENCOUNTER — OUTPATIENT (OUTPATIENT)
Dept: OUTPATIENT SERVICES | Facility: HOSPITAL | Age: 68
LOS: 1 days | End: 2023-11-03
Payer: COMMERCIAL

## 2023-11-03 ENCOUNTER — APPOINTMENT (OUTPATIENT)
Dept: MAMMOGRAPHY | Facility: HOSPITAL | Age: 68
End: 2023-11-03
Payer: COMMERCIAL

## 2023-11-03 ENCOUNTER — APPOINTMENT (OUTPATIENT)
Dept: ULTRASOUND IMAGING | Facility: HOSPITAL | Age: 68
End: 2023-11-03
Payer: COMMERCIAL

## 2023-11-03 DIAGNOSIS — Z98.890 OTHER SPECIFIED POSTPROCEDURAL STATES: Chronic | ICD-10-CM

## 2023-11-03 DIAGNOSIS — Z80.3 FAMILY HISTORY OF MALIGNANT NEOPLASM OF BREAST: ICD-10-CM

## 2023-11-03 PROCEDURE — 76641 ULTRASOUND BREAST COMPLETE: CPT | Mod: 26,50

## 2023-11-03 PROCEDURE — 77067 SCR MAMMO BI INCL CAD: CPT | Mod: 26

## 2023-11-03 PROCEDURE — 76641 ULTRASOUND BREAST COMPLETE: CPT

## 2023-11-03 PROCEDURE — 77063 BREAST TOMOSYNTHESIS BI: CPT | Mod: 26

## 2023-11-03 PROCEDURE — 77063 BREAST TOMOSYNTHESIS BI: CPT

## 2023-11-03 PROCEDURE — 77067 SCR MAMMO BI INCL CAD: CPT

## 2024-01-01 NOTE — ED PROVIDER NOTE - NEURO NEGATIVE STATEMENT, MLM
no loss of consciousness, no gait abnormality, no headache, no sensory deficits, and no weakness.
uzma

## 2024-01-04 ENCOUNTER — APPOINTMENT (OUTPATIENT)
Dept: ORTHOPEDIC SURGERY | Facility: CLINIC | Age: 69
End: 2024-01-04
Payer: COMMERCIAL

## 2024-01-04 VITALS — BODY MASS INDEX: 27.38 KG/M2 | HEIGHT: 61 IN | WEIGHT: 145 LBS

## 2024-01-04 DIAGNOSIS — M25.561 PAIN IN RIGHT KNEE: ICD-10-CM

## 2024-01-04 PROCEDURE — 73564 X-RAY EXAM KNEE 4 OR MORE: CPT | Mod: RT

## 2024-01-04 PROCEDURE — 99213 OFFICE O/P EST LOW 20 MIN: CPT

## 2024-01-10 ENCOUNTER — OUTPATIENT (OUTPATIENT)
Dept: OUTPATIENT SERVICES | Facility: HOSPITAL | Age: 69
LOS: 1 days | End: 2024-01-10
Payer: COMMERCIAL

## 2024-01-10 ENCOUNTER — APPOINTMENT (OUTPATIENT)
Dept: MRI IMAGING | Facility: HOSPITAL | Age: 69
End: 2024-01-10
Payer: COMMERCIAL

## 2024-01-10 DIAGNOSIS — Z98.890 OTHER SPECIFIED POSTPROCEDURAL STATES: Chronic | ICD-10-CM

## 2024-01-10 DIAGNOSIS — Z00.8 ENCOUNTER FOR OTHER GENERAL EXAMINATION: ICD-10-CM

## 2024-01-10 PROCEDURE — 73721 MRI JNT OF LWR EXTRE W/O DYE: CPT

## 2024-01-10 PROCEDURE — 73721 MRI JNT OF LWR EXTRE W/O DYE: CPT | Mod: 26,RT

## 2024-01-11 ENCOUNTER — TRANSCRIPTION ENCOUNTER (OUTPATIENT)
Age: 69
End: 2024-01-11

## 2024-01-12 PROBLEM — M25.561 RIGHT KNEE PAIN, UNSPECIFIED CHRONICITY: Status: ACTIVE | Noted: 2024-01-12

## 2024-01-12 NOTE — HISTORY OF PRESENT ILLNESS
[de-identified] : 68 year old female presents today with right knee pain since November 2023. 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 The pain is presents at night and getting out chairs, and stairs. C/O buckling/ stiffness. She is not taking pain medication. Denies catching, locking, numbness or tingling. Status post left knee ACL reconstruction (BTB allo) 4.19.19

## 2024-01-12 NOTE — PHYSICAL EXAM
[de-identified] : Left Knee Exam:   Skin: Clean, dry, intact Inspection: No obvious malalignment, no masses, no swelling, no effusion Pulses: 2+ DP/PT pulses ROM: 0-135 degrees of flexion. No pain with deep knee flexion/extension. Tenderness: min MJLT. No LJLT. No pain over the patella facets. No pain to the quadriceps tendon. No pain to the patella tendon. No posterior knee tenderness. Stability: Stable to varus, valgus.  Equivocal Lachman testing. Negative anterior drawer, negative posterior drawer. Strength: 5/5 Q/H/TA/GS/EHL, without atrophy Neuro: Intact to light touch throughout, DTRs normal Additional Tests: Negative Jennifer's test, Negative patellar grind test Other findings: None. [de-identified] : The following radiographs were ordered and read by me during this patients visit. I reviewed each radiograph in detail with the patient and discussed the findings as highlighted below.   4 views of the left knee were obtained today, 01/04/2024, that show no acute fracture or dislocation. There is no medial, no lateral and minimal patellofemoral degenerative changes seen. There is no significant malalignment. No significant other obvious osseous abnormality, otherwise unremarkable.

## 2024-01-12 NOTE — DISCUSSION/SUMMARY
[de-identified] : 67 y/o female with right knee pain.   Patient presents for evaluation of acute right knee pain.  Clinical examination of the patient's knee is equivocal, and there may be some increased AP laxity on examination.  However, consistent with likely underlying arthrosis.  Given history of ACL injury, recommendation would be for advanced imaging to ensure that there is no further internal derangement to the ligamentous structures of the knee.  We discussed treatment for degenerative knee pain in detail that would likely include conservative management (e.g. RICE, activity modification, PT, injection therapy).  Patient is agreeable.  Recommendation: Rest, ice, compression, elevation (RICE) and OTC NSAID's as instructed until MRI imaging.  Follow up after MRI.

## 2024-01-12 NOTE — ADDENDUM
[FreeTextEntry1] : This note was written by Venecia Bradford on 01/04/2024 acting solely as a scribe for Dr. Carson Machado.  All medical record entries made by the Scribe were at my, Dr. Carson Machado, direction and personally dictated by me on 01/04/2024. I have personally reviewed the chart and agree that the record accurately reflects my personal performance of the history, physical exam, assessment and plan.

## 2024-01-16 ENCOUNTER — NON-APPOINTMENT (OUTPATIENT)
Age: 69
End: 2024-01-16

## 2024-03-05 ENCOUNTER — APPOINTMENT (OUTPATIENT)
Dept: ORTHOPEDIC SURGERY | Facility: CLINIC | Age: 69
End: 2024-03-05
Payer: COMMERCIAL

## 2024-03-05 VITALS
HEIGHT: 61 IN | BODY MASS INDEX: 27.38 KG/M2 | WEIGHT: 145 LBS | HEART RATE: 77 BPM | SYSTOLIC BLOOD PRESSURE: 125 MMHG | DIASTOLIC BLOOD PRESSURE: 71 MMHG

## 2024-03-05 DIAGNOSIS — M25.561 PAIN IN RIGHT KNEE: ICD-10-CM

## 2024-03-05 PROCEDURE — 20610 DRAIN/INJ JOINT/BURSA W/O US: CPT | Mod: RT

## 2024-03-05 PROCEDURE — 99214 OFFICE O/P EST MOD 30 MIN: CPT | Mod: 25

## 2024-03-07 PROBLEM — M25.561 ACUTE PAIN OF RIGHT KNEE: Status: ACTIVE | Noted: 2024-01-12

## 2024-03-07 NOTE — HISTORY OF PRESENT ILLNESS
[de-identified] : 68 year old female presents today for follow up of knee right knee pain. Patient aggravated her knee 2 week ago. Patient hit her toe causing her knee to bend forward. She had difficulty walking and swollen. Pain has improved since the injury but has residual swelling. She has continued PT attending twice a week which has been helpful. She is not taking pain medication. Bracing is helpful. Denies catching, locking, numbness or tingling. Status post left knee ACL reconstruction (BTB allo) 4.19.19

## 2024-03-07 NOTE — ADDENDUM
[FreeTextEntry1] : This note was written by Kervin Burroughs on  acting solely as a scribe for Dr. Carson Machado.   All medical record entries made by the Scribe were at my, Dr. Carson Machado, direction and personally dictated by me on . I have personally reviewed the chart and agree that the record accurately reflects my personal performance of the history, physical exam, assessment and plan.

## 2024-03-07 NOTE — DISCUSSION/SUMMARY
[de-identified] : 69 y/o female with right knee pain.   Patient presents for further evaluation of her right knee.  Patient reported a recent fall, and has a gross effusion on clinical examination.  We discussed that the majority of his symptoms are likely related to the underlying effusion, and the knee effusion was aspirated and injected today.  Patient reported significant immediate relief.  Recommendation: Conservative care & observation, this includes rest/activity avoidance until less symptomatic with subsequent gradual return to full activity as tolerated. Patient may also use OTC NSAIDs or acetaminophen as tolerated, with application of ice to the area 2-3x daily for 20 minutes after periods of activity.   Follow up p.r.n.

## 2024-03-07 NOTE — PHYSICAL EXAM
[de-identified] : Right Knee Exam:   Skin: Clean, dry, intact Inspection: No obvious malalignment, no masses, no swelling, large effusion Pulses: 2+ DP/PT pulses ROM: 0-135 degrees of flexion. No pain with deep knee flexion/extension. Tenderness: min MJLT. No LJLT. No pain over the patella facets. No pain to the quadriceps tendon. No pain to the patella tendon. No posterior knee tenderness. Stability: Stable to varus, valgus.  Equivocal Lachman testing. Negative anterior drawer, negative posterior drawer. Strength: 5/5 Q/H/TA/GS/EHL, without atrophy Neuro: Intact to light touch throughout, DTRs normal Additional Tests: Negative Jennifer's test, Negative patellar grind test Other findings: None. [de-identified] : The following radiographs were ordered and read by me during this patients visit. I reviewed each radiograph in detail with the patient and discussed the findings as highlighted below.   4 views of the left knee were obtained today, 3/5/24, that show no acute fracture or dislocation. There is no medial, no lateral and no patellofemoral degenerative changes seen. There is no significant malalignment. No significant other obvious osseous abnormality, otherwise unremarkable.  MRI right knee dated 1/10/2024 shows evidence of a moderate effusion, small popliteal cyst. Ligamentously intact. Free edge tear radial medial meniscus. Patellofemoral chondrosis.

## 2024-03-07 NOTE — PROCEDURE
[de-identified] : Aspiration & Injection: Right knee joint. Indication: Effusion.   A discussion was had with the patient regarding this procedure and all questions were answered. All risks, benefits and alternatives were discussed. These included but were not limited to bleeding, infection, allergic reaction and reaccumulation of fluid. Alcohol was used to clean the skin, and betadine was used to sterilize and prep the area in the supero-lateral aspect of the right knee. Ethyl chloride spray was then used as a topical anesthetic. An 18-gauge needle was used to aspirate the knee joint and approximately 23 cc of inflammatory fluid was aspirated from the right knee without complication. In addition, following the aspiration, an injection of 4cc of 1% lidocaine and 1cc of 40mg/ml methylprednisolone also inserted into the knee via the same needle. A sterile bandage was then applied. The patient tolerated the procedure well.

## 2024-09-15 ENCOUNTER — NON-APPOINTMENT (OUTPATIENT)
Age: 69
End: 2024-09-15

## 2024-10-15 ENCOUNTER — NON-APPOINTMENT (OUTPATIENT)
Age: 69
End: 2024-10-15

## 2025-02-24 ENCOUNTER — APPOINTMENT (OUTPATIENT)
Dept: ULTRASOUND IMAGING | Facility: HOSPITAL | Age: 70
End: 2025-02-24
Payer: MEDICARE

## 2025-02-24 ENCOUNTER — APPOINTMENT (OUTPATIENT)
Dept: MAMMOGRAPHY | Facility: HOSPITAL | Age: 70
End: 2025-02-24
Payer: MEDICARE

## 2025-02-24 ENCOUNTER — OUTPATIENT (OUTPATIENT)
Dept: OUTPATIENT SERVICES | Facility: HOSPITAL | Age: 70
LOS: 1 days | End: 2025-02-24
Payer: MEDICARE

## 2025-02-24 DIAGNOSIS — Z00.8 ENCOUNTER FOR OTHER GENERAL EXAMINATION: ICD-10-CM

## 2025-02-24 DIAGNOSIS — Z98.890 OTHER SPECIFIED POSTPROCEDURAL STATES: Chronic | ICD-10-CM

## 2025-02-24 PROCEDURE — 76641 ULTRASOUND BREAST COMPLETE: CPT | Mod: 26,50,GA

## 2025-02-24 PROCEDURE — 77063 BREAST TOMOSYNTHESIS BI: CPT | Mod: 26

## 2025-02-24 PROCEDURE — 76641 ULTRASOUND BREAST COMPLETE: CPT

## 2025-02-24 PROCEDURE — 77067 SCR MAMMO BI INCL CAD: CPT | Mod: 26

## 2025-02-24 PROCEDURE — 77063 BREAST TOMOSYNTHESIS BI: CPT

## 2025-02-24 PROCEDURE — 77067 SCR MAMMO BI INCL CAD: CPT

## 2025-05-02 NOTE — ED ADULT NURSE NOTE - NSFALLRSKPASTHIST_ED_ALL_ED
[FreeTextEntry1] : fu- face spots [de-identified] : 71 y/o F here for spot check #spots x yrs, some very itchy bothersome on back, interested in cosmetic removal  no

## 2025-07-18 NOTE — ASU PREOPERATIVE ASSESSMENT, ADULT ([facility] ONLY) - TEACHING/LEARNING DEVELOPMENTAL CONSIDERATIONS
Called pt , received request for dental clearance pt requiring dental extractions. Pt on avasting /5FU recommend holding treatment 4-8 weeks for major surgery, will hold treatment on 7/28 and then have dental procedure 3-4 weeks after last treatment.Called and left message to call back.    none